# Patient Record
Sex: FEMALE | Race: WHITE | NOT HISPANIC OR LATINO
[De-identification: names, ages, dates, MRNs, and addresses within clinical notes are randomized per-mention and may not be internally consistent; named-entity substitution may affect disease eponyms.]

---

## 2017-02-23 ENCOUNTER — APPOINTMENT (OUTPATIENT)
Dept: ULTRASOUND IMAGING | Facility: IMAGING CENTER | Age: 67
End: 2017-02-23

## 2017-02-23 ENCOUNTER — OUTPATIENT (OUTPATIENT)
Dept: OUTPATIENT SERVICES | Facility: HOSPITAL | Age: 67
LOS: 1 days | End: 2017-02-23
Payer: MEDICARE

## 2017-02-23 ENCOUNTER — APPOINTMENT (OUTPATIENT)
Dept: MAMMOGRAPHY | Facility: IMAGING CENTER | Age: 67
End: 2017-02-23

## 2017-02-23 DIAGNOSIS — Z00.8 ENCOUNTER FOR OTHER GENERAL EXAMINATION: ICD-10-CM

## 2017-02-23 DIAGNOSIS — N60.29 FIBROADENOSIS OF UNSPECIFIED BREAST: Chronic | ICD-10-CM

## 2017-02-23 DIAGNOSIS — Z90.49 ACQUIRED ABSENCE OF OTHER SPECIFIED PARTS OF DIGESTIVE TRACT: Chronic | ICD-10-CM

## 2017-02-23 PROCEDURE — G0279: CPT

## 2017-02-23 PROCEDURE — 76641 ULTRASOUND BREAST COMPLETE: CPT

## 2017-02-23 PROCEDURE — 77066 DX MAMMO INCL CAD BI: CPT

## 2017-05-08 ENCOUNTER — OUTPATIENT (OUTPATIENT)
Dept: OUTPATIENT SERVICES | Facility: HOSPITAL | Age: 67
LOS: 1 days | Discharge: HOME | End: 2017-05-08

## 2017-05-08 DIAGNOSIS — N60.29 FIBROADENOSIS OF UNSPECIFIED BREAST: Chronic | ICD-10-CM

## 2017-05-08 DIAGNOSIS — Z90.49 ACQUIRED ABSENCE OF OTHER SPECIFIED PARTS OF DIGESTIVE TRACT: Chronic | ICD-10-CM

## 2017-05-08 DIAGNOSIS — C50.919 MALIGNANT NEOPLASM OF UNSPECIFIED SITE OF UNSPECIFIED FEMALE BREAST: ICD-10-CM

## 2017-05-10 ENCOUNTER — APPOINTMENT (OUTPATIENT)
Dept: HEMATOLOGY ONCOLOGY | Facility: CLINIC | Age: 67
End: 2017-05-10

## 2017-05-10 VITALS
WEIGHT: 148.15 LBS | OXYGEN SATURATION: 95 % | TEMPERATURE: 98 F | DIASTOLIC BLOOD PRESSURE: 80 MMHG | HEART RATE: 77 BPM | SYSTOLIC BLOOD PRESSURE: 140 MMHG | BODY MASS INDEX: 25.43 KG/M2 | RESPIRATION RATE: 16 BRPM

## 2017-08-17 ENCOUNTER — OUTPATIENT (OUTPATIENT)
Dept: OUTPATIENT SERVICES | Facility: HOSPITAL | Age: 67
LOS: 1 days | End: 2017-08-17
Payer: MEDICARE

## 2017-08-17 ENCOUNTER — APPOINTMENT (OUTPATIENT)
Dept: MRI IMAGING | Facility: IMAGING CENTER | Age: 67
End: 2017-08-17
Payer: COMMERCIAL

## 2017-08-17 DIAGNOSIS — N60.29 FIBROADENOSIS OF UNSPECIFIED BREAST: Chronic | ICD-10-CM

## 2017-08-17 DIAGNOSIS — Z00.8 ENCOUNTER FOR OTHER GENERAL EXAMINATION: ICD-10-CM

## 2017-08-17 DIAGNOSIS — Z90.49 ACQUIRED ABSENCE OF OTHER SPECIFIED PARTS OF DIGESTIVE TRACT: Chronic | ICD-10-CM

## 2017-08-17 PROCEDURE — A9585: CPT

## 2017-08-17 PROCEDURE — 77059 MRI BREAST BILATERAL: CPT | Mod: 26

## 2017-08-17 PROCEDURE — C8908: CPT

## 2017-08-17 PROCEDURE — C8937: CPT

## 2017-08-17 PROCEDURE — 0159T: CPT | Mod: 26

## 2017-08-17 PROCEDURE — 82565 ASSAY OF CREATININE: CPT

## 2017-11-16 ENCOUNTER — OUTPATIENT (OUTPATIENT)
Dept: OUTPATIENT SERVICES | Facility: HOSPITAL | Age: 67
LOS: 1 days | Discharge: HOME | End: 2017-11-16

## 2017-11-16 DIAGNOSIS — C50.919 MALIGNANT NEOPLASM OF UNSPECIFIED SITE OF UNSPECIFIED FEMALE BREAST: ICD-10-CM

## 2017-11-16 DIAGNOSIS — N60.29 FIBROADENOSIS OF UNSPECIFIED BREAST: Chronic | ICD-10-CM

## 2017-11-16 DIAGNOSIS — Z90.49 ACQUIRED ABSENCE OF OTHER SPECIFIED PARTS OF DIGESTIVE TRACT: Chronic | ICD-10-CM

## 2017-11-22 ENCOUNTER — APPOINTMENT (OUTPATIENT)
Dept: HEMATOLOGY ONCOLOGY | Facility: CLINIC | Age: 67
End: 2017-11-22
Payer: MEDICARE

## 2017-11-22 ENCOUNTER — RESULT REVIEW (OUTPATIENT)
Age: 67
End: 2017-11-22

## 2017-11-22 VITALS
HEART RATE: 83 BPM | OXYGEN SATURATION: 98 % | TEMPERATURE: 98.4 F | RESPIRATION RATE: 16 BRPM | BODY MASS INDEX: 25.35 KG/M2 | WEIGHT: 147.71 LBS

## 2017-11-22 LAB
HCT VFR BLD CALC: 43 % — SIGNIFICANT CHANGE UP (ref 34.5–45)
HGB BLD-MCNC: 14.6 G/DL — SIGNIFICANT CHANGE UP (ref 11.5–15.5)
MCHC RBC-ENTMCNC: 28.1 PG — SIGNIFICANT CHANGE UP (ref 27–34)
MCHC RBC-ENTMCNC: 34 G/DL — SIGNIFICANT CHANGE UP (ref 32–36)
MCV RBC AUTO: 82.6 FL — SIGNIFICANT CHANGE UP (ref 80–100)
PLATELET # BLD AUTO: 142 K/UL — LOW (ref 150–400)
RBC # BLD: 5.21 M/UL — HIGH (ref 3.8–5.2)
RBC # FLD: 11.8 % — SIGNIFICANT CHANGE UP (ref 10.3–14.5)
WBC # BLD: 8.3 K/UL — SIGNIFICANT CHANGE UP (ref 3.8–10.5)
WBC # FLD AUTO: 8.3 K/UL — SIGNIFICANT CHANGE UP (ref 3.8–10.5)

## 2017-11-22 PROCEDURE — 99215 OFFICE O/P EST HI 40 MIN: CPT

## 2017-11-28 LAB
25(OH)D3 SERPL-MCNC: 32.7 NG/ML
ALBUMIN SERPL ELPH-MCNC: 4.5 G/DL
ALP BLD-CCNC: 53 U/L
ALT SERPL-CCNC: 37 U/L
ANION GAP SERPL CALC-SCNC: 14 MMOL/L
AST SERPL-CCNC: 25 U/L
BILIRUB SERPL-MCNC: 0.8 MG/DL
BUN SERPL-MCNC: 24 MG/DL
CALCIUM SERPL-MCNC: 10.9 MG/DL
CHLORIDE SERPL-SCNC: 102 MMOL/L
CO2 SERPL-SCNC: 25 MMOL/L
CREAT SERPL-MCNC: 1.22 MG/DL
GLUCOSE SERPL-MCNC: 112 MG/DL
POTASSIUM SERPL-SCNC: 4.9 MMOL/L
PROT SERPL-MCNC: 7 G/DL
SODIUM SERPL-SCNC: 141 MMOL/L

## 2017-11-29 ENCOUNTER — APPOINTMENT (OUTPATIENT)
Dept: SURGERY | Facility: CLINIC | Age: 67
End: 2017-11-29
Payer: MEDICARE

## 2017-11-29 PROCEDURE — 99213K: CUSTOM

## 2017-12-05 ENCOUNTER — TRANSCRIPTION ENCOUNTER (OUTPATIENT)
Age: 67
End: 2017-12-05

## 2017-12-14 ENCOUNTER — LABORATORY RESULT (OUTPATIENT)
Age: 67
End: 2017-12-14

## 2017-12-14 ENCOUNTER — RESULT REVIEW (OUTPATIENT)
Age: 67
End: 2017-12-14

## 2017-12-14 ENCOUNTER — APPOINTMENT (OUTPATIENT)
Dept: HEMATOLOGY ONCOLOGY | Facility: CLINIC | Age: 67
End: 2017-12-14

## 2017-12-14 LAB
BASOPHILS # BLD AUTO: 0.1 K/UL — SIGNIFICANT CHANGE UP (ref 0–0.2)
BASOPHILS NFR BLD AUTO: 0.7 % — SIGNIFICANT CHANGE UP (ref 0–2)
EOSINOPHIL # BLD AUTO: 0.1 K/UL — SIGNIFICANT CHANGE UP (ref 0–0.5)
EOSINOPHIL NFR BLD AUTO: 0.7 % — SIGNIFICANT CHANGE UP (ref 0–6)
HCT VFR BLD CALC: 42.4 % — SIGNIFICANT CHANGE UP (ref 34.5–45)
HGB BLD-MCNC: 14.4 G/DL — SIGNIFICANT CHANGE UP (ref 11.5–15.5)
LYMPHOCYTES # BLD AUTO: 1.6 K/UL — SIGNIFICANT CHANGE UP (ref 1–3.3)
LYMPHOCYTES # BLD AUTO: 19.8 % — SIGNIFICANT CHANGE UP (ref 13–44)
MCHC RBC-ENTMCNC: 28.2 PG — SIGNIFICANT CHANGE UP (ref 27–34)
MCHC RBC-ENTMCNC: 34 G/DL — SIGNIFICANT CHANGE UP (ref 32–36)
MCV RBC AUTO: 83.1 FL — SIGNIFICANT CHANGE UP (ref 80–100)
MONOCYTES # BLD AUTO: 0.6 K/UL — SIGNIFICANT CHANGE UP (ref 0–0.9)
MONOCYTES NFR BLD AUTO: 7.6 % — SIGNIFICANT CHANGE UP (ref 2–14)
NEUTROPHILS # BLD AUTO: 5.8 K/UL — SIGNIFICANT CHANGE UP (ref 1.8–7.4)
NEUTROPHILS NFR BLD AUTO: 71.1 % — SIGNIFICANT CHANGE UP (ref 43–77)
PLATELET # BLD AUTO: 150 K/UL — SIGNIFICANT CHANGE UP (ref 150–400)
RBC # BLD: 5.1 M/UL — SIGNIFICANT CHANGE UP (ref 3.8–5.2)
RBC # FLD: 11.4 % — SIGNIFICANT CHANGE UP (ref 10.3–14.5)
WBC # BLD: 8.2 K/UL — SIGNIFICANT CHANGE UP (ref 3.8–10.5)
WBC # FLD AUTO: 8.2 K/UL — SIGNIFICANT CHANGE UP (ref 3.8–10.5)

## 2017-12-18 ENCOUNTER — CHART COPY (OUTPATIENT)
Age: 67
End: 2017-12-18

## 2017-12-19 ENCOUNTER — RESULT REVIEW (OUTPATIENT)
Age: 67
End: 2017-12-19

## 2017-12-19 LAB
ALBUMIN SERPL ELPH-MCNC: 4.3 G/DL
ALP BLD-CCNC: 56 U/L
ALT SERPL-CCNC: 28 U/L
ANION GAP SERPL CALC-SCNC: 15 MMOL/L
AST SERPL-CCNC: 18 U/L
BILIRUB SERPL-MCNC: 1 MG/DL
BUN SERPL-MCNC: 21 MG/DL
CALCIUM SERPL-MCNC: 10.9 MG/DL
CHLORIDE SERPL-SCNC: 104 MMOL/L
CO2 SERPL-SCNC: 24 MMOL/L
CREAT SERPL-MCNC: 1.06 MG/DL
GLUCOSE SERPL-MCNC: 103 MG/DL
POTASSIUM SERPL-SCNC: 4.9 MMOL/L
PROT SERPL-MCNC: 6.9 G/DL
SODIUM SERPL-SCNC: 143 MMOL/L

## 2018-02-28 ENCOUNTER — OUTPATIENT (OUTPATIENT)
Dept: OUTPATIENT SERVICES | Facility: HOSPITAL | Age: 68
LOS: 1 days | End: 2018-02-28
Payer: MEDICARE

## 2018-02-28 ENCOUNTER — APPOINTMENT (OUTPATIENT)
Dept: MAMMOGRAPHY | Facility: IMAGING CENTER | Age: 68
End: 2018-02-28
Payer: MEDICARE

## 2018-02-28 ENCOUNTER — APPOINTMENT (OUTPATIENT)
Dept: ULTRASOUND IMAGING | Facility: IMAGING CENTER | Age: 68
End: 2018-02-28
Payer: MEDICARE

## 2018-02-28 DIAGNOSIS — Z00.8 ENCOUNTER FOR OTHER GENERAL EXAMINATION: ICD-10-CM

## 2018-02-28 DIAGNOSIS — N60.29 FIBROADENOSIS OF UNSPECIFIED BREAST: Chronic | ICD-10-CM

## 2018-02-28 DIAGNOSIS — Z90.49 ACQUIRED ABSENCE OF OTHER SPECIFIED PARTS OF DIGESTIVE TRACT: Chronic | ICD-10-CM

## 2018-02-28 PROCEDURE — 77066 DX MAMMO INCL CAD BI: CPT | Mod: 26

## 2018-02-28 PROCEDURE — 76641 ULTRASOUND BREAST COMPLETE: CPT | Mod: 26,50

## 2018-02-28 PROCEDURE — G0279: CPT | Mod: 26

## 2018-02-28 PROCEDURE — 77066 DX MAMMO INCL CAD BI: CPT

## 2018-02-28 PROCEDURE — 76641 ULTRASOUND BREAST COMPLETE: CPT

## 2018-02-28 PROCEDURE — G0279: CPT

## 2018-05-18 ENCOUNTER — OUTPATIENT (OUTPATIENT)
Dept: OUTPATIENT SERVICES | Facility: HOSPITAL | Age: 68
LOS: 1 days | Discharge: HOME | End: 2018-05-18

## 2018-05-18 DIAGNOSIS — N60.29 FIBROADENOSIS OF UNSPECIFIED BREAST: Chronic | ICD-10-CM

## 2018-05-18 DIAGNOSIS — C50.919 MALIGNANT NEOPLASM OF UNSPECIFIED SITE OF UNSPECIFIED FEMALE BREAST: ICD-10-CM

## 2018-05-18 DIAGNOSIS — Z90.49 ACQUIRED ABSENCE OF OTHER SPECIFIED PARTS OF DIGESTIVE TRACT: Chronic | ICD-10-CM

## 2018-05-23 ENCOUNTER — APPOINTMENT (OUTPATIENT)
Dept: HEMATOLOGY ONCOLOGY | Facility: CLINIC | Age: 68
End: 2018-05-23
Payer: MEDICARE

## 2018-05-23 VITALS
RESPIRATION RATE: 16 BRPM | OXYGEN SATURATION: 100 % | TEMPERATURE: 98.7 F | HEART RATE: 81 BPM | WEIGHT: 141.1 LBS | SYSTOLIC BLOOD PRESSURE: 153 MMHG | DIASTOLIC BLOOD PRESSURE: 75 MMHG | BODY MASS INDEX: 24.22 KG/M2

## 2018-05-23 PROCEDURE — 99215 OFFICE O/P EST HI 40 MIN: CPT

## 2018-08-14 ENCOUNTER — OUTPATIENT (OUTPATIENT)
Dept: OUTPATIENT SERVICES | Facility: HOSPITAL | Age: 68
LOS: 1 days | End: 2018-08-14
Payer: MEDICARE

## 2018-08-14 ENCOUNTER — APPOINTMENT (OUTPATIENT)
Dept: MRI IMAGING | Facility: IMAGING CENTER | Age: 68
End: 2018-08-14
Payer: MEDICARE

## 2018-08-14 DIAGNOSIS — N60.29 FIBROADENOSIS OF UNSPECIFIED BREAST: Chronic | ICD-10-CM

## 2018-08-14 DIAGNOSIS — Z90.49 ACQUIRED ABSENCE OF OTHER SPECIFIED PARTS OF DIGESTIVE TRACT: Chronic | ICD-10-CM

## 2018-08-14 DIAGNOSIS — Z00.8 ENCOUNTER FOR OTHER GENERAL EXAMINATION: ICD-10-CM

## 2018-08-14 PROCEDURE — 77059 MRI BREAST BILATERAL: CPT | Mod: 26

## 2018-08-14 PROCEDURE — 82565 ASSAY OF CREATININE: CPT

## 2018-08-14 PROCEDURE — 0159T: CPT | Mod: 26

## 2018-08-14 PROCEDURE — C8937: CPT

## 2018-08-14 PROCEDURE — A9585: CPT

## 2018-08-14 PROCEDURE — C8908: CPT

## 2018-11-13 ENCOUNTER — OUTPATIENT (OUTPATIENT)
Dept: OUTPATIENT SERVICES | Facility: HOSPITAL | Age: 68
LOS: 1 days | Discharge: HOME | End: 2018-11-13

## 2018-11-13 DIAGNOSIS — Z90.49 ACQUIRED ABSENCE OF OTHER SPECIFIED PARTS OF DIGESTIVE TRACT: Chronic | ICD-10-CM

## 2018-11-13 DIAGNOSIS — C50.919 MALIGNANT NEOPLASM OF UNSPECIFIED SITE OF UNSPECIFIED FEMALE BREAST: ICD-10-CM

## 2018-11-13 DIAGNOSIS — N60.29 FIBROADENOSIS OF UNSPECIFIED BREAST: Chronic | ICD-10-CM

## 2018-11-14 ENCOUNTER — FORM ENCOUNTER (OUTPATIENT)
Age: 68
End: 2018-11-14

## 2018-11-15 ENCOUNTER — APPOINTMENT (OUTPATIENT)
Dept: RADIOLOGY | Facility: IMAGING CENTER | Age: 68
End: 2018-11-15
Payer: MEDICARE

## 2018-11-15 ENCOUNTER — OUTPATIENT (OUTPATIENT)
Dept: OUTPATIENT SERVICES | Facility: HOSPITAL | Age: 68
LOS: 1 days | End: 2018-11-15
Payer: MEDICARE

## 2018-11-15 DIAGNOSIS — N60.29 FIBROADENOSIS OF UNSPECIFIED BREAST: Chronic | ICD-10-CM

## 2018-11-15 DIAGNOSIS — Z90.49 ACQUIRED ABSENCE OF OTHER SPECIFIED PARTS OF DIGESTIVE TRACT: Chronic | ICD-10-CM

## 2018-11-15 DIAGNOSIS — C50.919 MALIGNANT NEOPLASM OF UNSPECIFIED SITE OF UNSPECIFIED FEMALE BREAST: ICD-10-CM

## 2018-11-15 PROCEDURE — 77080 DXA BONE DENSITY AXIAL: CPT

## 2018-11-15 PROCEDURE — 77080 DXA BONE DENSITY AXIAL: CPT | Mod: 26

## 2018-11-21 ENCOUNTER — APPOINTMENT (OUTPATIENT)
Dept: HEMATOLOGY ONCOLOGY | Facility: CLINIC | Age: 68
End: 2018-11-21
Payer: MEDICARE

## 2018-11-21 VITALS
TEMPERATURE: 99.4 F | WEIGHT: 142.86 LBS | OXYGEN SATURATION: 100 % | HEART RATE: 77 BPM | DIASTOLIC BLOOD PRESSURE: 83 MMHG | SYSTOLIC BLOOD PRESSURE: 143 MMHG | BODY MASS INDEX: 24.52 KG/M2 | RESPIRATION RATE: 16 BRPM

## 2018-11-21 DIAGNOSIS — R19.7 DIARRHEA, UNSPECIFIED: ICD-10-CM

## 2018-11-21 PROCEDURE — 99214 OFFICE O/P EST MOD 30 MIN: CPT

## 2018-11-21 NOTE — PHYSICAL EXAM
[Fully active, able to carry on all pre-disease performance without restriction] : Status 0 - Fully active, able to carry on all pre-disease performance without restriction [Normal] : affect appropriate [de-identified] : Left breast periareolar lumpectomy scar with post RT changes.

## 2018-11-21 NOTE — ASSESSMENT
[FreeTextEntry1] : In summary, this is a 68-year-old postmenopausal  lady with stage IA invasive ductal carcinoma of the left breast. T1a, N0, M0. ER positive, MS positive, HER-2/nimco negative. She is status post lumpectomy and sentinel axillary lymph node excision.  Patient has a good performance status and is generally very healthy. She completed RT and started Letrozole July 2016.\par \par - Breast ca: LIZETTE. Tolerating Letrozole very well without significant side effects. Reports good compliance. Continue AI for total of 5-10 years. Breast imaging per Dr Reddy.\par - Osteopenia- DEXA results reviewed with the pt (11/2018). Continue calcium and vitamin D. \par - High chol:  Continue simvastatin. Healthy diet and exercise \par - Diarrhea- Work up ordered but she wants to see PCP. Doesnt have typical sx of HUS/infection or cdiff\par - Continue to followup with primary care and Gyn\par \par RTC 6 m.

## 2018-11-21 NOTE — HISTORY OF PRESENT ILLNESS
[Date: ____________] : Patient's last distress assessment performed on [unfilled]. [0 - No Distress] : Distress Level: 0 [Patient given social work contact information and resource sheet] : Patient was given social work contact information and resource sheet [de-identified] : Carolyn Busby is a 68-year-old lady who underwent diagnostic mammogram and sonogram on 2/10/16 which showed new left breast distortion and a stereotactic biopsy was recommended. The biopsy was performed at [facility] on 2/22/16 which showed invasive well-differentiated ductal carcinoma with tubular features, Arturo score 4 of 9, 0.3 cm, DCIS low-grade, %, %, HER-2 negative. She underwent breast MRI on 3/3/16 which showed additional 0.7 cm indeterminate small focal area of enhancement in central posterior left breast  and a 0.8 cm enhancing nodule superior central right breast at 12:00.\par She underwent MRI guided biopsy of both lesions. The pathology report from left side showed fibrocystic changes. 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 She underwent bilateral lumpectomies and left sentinel axillary lymph node excision on 3/15/16 which showed invasive ductal carcinoma, measuring 3 mm, grade 1, intermediate grade DCIS in the left breast. 3 out of 3 sentinel lymph nodes were negative. Margins were negative and no lymphovascular invasion was noted. %, %, HER-2/nimco negative. Right breast excision showed flat epithelial atypia and benign changes.\par \par The last bone density scan from 7/10/2014 showed osteopenia with a T score of -1.6 at left femoral neck. \par She refused genetic testing as her niece has been tested negative for deleterious BRCA mutation.\par RT completed June 2016\par  [FreeTextEntry1] : Letrozole started July 2016 [de-identified] : Ms. RENE BARROSO is here for f/u left breast cancer on endocrine therapy since 7/2016\par On letrozole. Good compliance\par No new aches/pain, hot flashes, vag dryness\par Has chronic back pain 2/2 bulging discs, which is unchanged, doesn’t need pain meds\par active, no change in energy, wt or appetite, gym 3-4 days a week\par cholesterol f/b PMD- well controlled. \par Breast imaging with Dr Reddy\par DEXA 11/2018- osteopenia (-1.8), cont vit D, calcium. \par She had Lettuce on Friday and has been having cramps and diarrhea 2-3 times a day. She took Imodium yesterday and it stopped all day.  No blood in her stool. Her PCP is closed. She is very concerned about Ecoli outbreak from Lettuce. I ordered stool studies but then she refused and decided to go see PCP.

## 2018-11-21 NOTE — REASON FOR VISIT
[Follow-Up Visit] : a follow-up [Other: _____] : [unfilled] [FreeTextEntry2] : Breast cancer ER/[place] + HER-2 -

## 2018-11-21 NOTE — OB HISTORY
[Definite:  ___ (Date)] : the last menstrual period was [unfilled] [Menarche Age: ____] : age at menarche was [unfilled] [Currently In Menopause] : currently in menopause [___] : Living: [unfilled] [Experiencing Menopausal Sxs] : not experiencing menopausal symptoms

## 2018-11-28 ENCOUNTER — APPOINTMENT (OUTPATIENT)
Dept: SURGERY | Facility: CLINIC | Age: 68
End: 2018-11-28
Payer: MEDICARE

## 2018-11-28 PROCEDURE — 99213K: CUSTOM

## 2019-03-07 ENCOUNTER — OUTPATIENT (OUTPATIENT)
Dept: OUTPATIENT SERVICES | Facility: HOSPITAL | Age: 69
LOS: 1 days | End: 2019-03-07
Payer: MEDICARE

## 2019-03-07 ENCOUNTER — APPOINTMENT (OUTPATIENT)
Dept: ULTRASOUND IMAGING | Facility: IMAGING CENTER | Age: 69
End: 2019-03-07
Payer: MEDICARE

## 2019-03-07 ENCOUNTER — APPOINTMENT (OUTPATIENT)
Dept: MAMMOGRAPHY | Facility: IMAGING CENTER | Age: 69
End: 2019-03-07
Payer: MEDICARE

## 2019-03-07 DIAGNOSIS — Z00.8 ENCOUNTER FOR OTHER GENERAL EXAMINATION: ICD-10-CM

## 2019-03-07 DIAGNOSIS — Z90.49 ACQUIRED ABSENCE OF OTHER SPECIFIED PARTS OF DIGESTIVE TRACT: Chronic | ICD-10-CM

## 2019-03-07 DIAGNOSIS — N60.29 FIBROADENOSIS OF UNSPECIFIED BREAST: Chronic | ICD-10-CM

## 2019-03-07 PROCEDURE — 76641 ULTRASOUND BREAST COMPLETE: CPT | Mod: 26,50

## 2019-03-07 PROCEDURE — G0279: CPT | Mod: 26

## 2019-03-07 PROCEDURE — 76641 ULTRASOUND BREAST COMPLETE: CPT

## 2019-03-07 PROCEDURE — 77066 DX MAMMO INCL CAD BI: CPT | Mod: 26

## 2019-03-07 PROCEDURE — 77066 DX MAMMO INCL CAD BI: CPT

## 2019-03-07 PROCEDURE — G0279: CPT

## 2019-05-21 ENCOUNTER — OUTPATIENT (OUTPATIENT)
Dept: OUTPATIENT SERVICES | Facility: HOSPITAL | Age: 69
LOS: 1 days | Discharge: HOME | End: 2019-05-21

## 2019-05-21 DIAGNOSIS — Z90.49 ACQUIRED ABSENCE OF OTHER SPECIFIED PARTS OF DIGESTIVE TRACT: Chronic | ICD-10-CM

## 2019-05-21 DIAGNOSIS — C50.919 MALIGNANT NEOPLASM OF UNSPECIFIED SITE OF UNSPECIFIED FEMALE BREAST: ICD-10-CM

## 2019-05-21 DIAGNOSIS — N60.29 FIBROADENOSIS OF UNSPECIFIED BREAST: Chronic | ICD-10-CM

## 2019-05-22 ENCOUNTER — APPOINTMENT (OUTPATIENT)
Dept: HEMATOLOGY ONCOLOGY | Facility: CLINIC | Age: 69
End: 2019-05-22
Payer: MEDICARE

## 2019-05-22 VITALS
SYSTOLIC BLOOD PRESSURE: 142 MMHG | HEART RATE: 75 BPM | RESPIRATION RATE: 16 BRPM | TEMPERATURE: 98.7 F | DIASTOLIC BLOOD PRESSURE: 84 MMHG | WEIGHT: 145.48 LBS | BODY MASS INDEX: 24.97 KG/M2 | OXYGEN SATURATION: 97 %

## 2019-05-22 PROCEDURE — 99214 OFFICE O/P EST MOD 30 MIN: CPT

## 2019-05-22 NOTE — PHYSICAL EXAM
[Fully active, able to carry on all pre-disease performance without restriction] : Status 0 - Fully active, able to carry on all pre-disease performance without restriction [Normal] : affect appropriate [de-identified] : Left breast periareolar lumpectomy scar with post RT changes

## 2019-05-22 NOTE — REASON FOR VISIT
[Follow-Up Visit] : a follow-up [Other: _____] : [unfilled] [FreeTextEntry2] : Breast cancer ER/[place] + HER-2 -

## 2019-05-22 NOTE — HISTORY OF PRESENT ILLNESS
[Date: ____________] : Patient's last distress assessment performed on [unfilled]. [0 - No Distress] : Distress Level: 0 [Patient given social work contact information and resource sheet] : Patient was given social work contact information and resource sheet [de-identified] : Carolyn Busby is a 69-year-old lady who underwent diagnostic mammogram and sonogram on 2/10/16 which showed new left breast distortion and a stereotactic biopsy was recommended. The biopsy was performed at [facility] on 2/22/16 which showed invasive well-differentiated ductal carcinoma with tubular features, Arturo score 4 of 9, 0.3 cm, DCIS low-grade, %, %, HER-2 negative. She underwent breast MRI on 3/3/16 which showed additional 0.7 cm indeterminate small focal area of enhancement in central posterior left breast  and a 0.8 cm enhancing nodule superior central right breast at 12:00.\par She underwent MRI guided biopsy of both lesions. The pathology report from left side showed fibrocystic changes. 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 She underwent bilateral lumpectomies and left sentinel axillary lymph node excision on 3/15/16 which showed invasive ductal carcinoma, measuring 3 mm, grade 1, intermediate grade DCIS in the left breast. 3 out of 3 sentinel lymph nodes were negative. Margins were negative and no lymphovascular invasion was noted. %, %, HER-2/nimco negative. Right breast excision showed flat epithelial atypia and benign changes.\par \par The last bone density scan from 7/10/2014 showed osteopenia with a T score of -1.6 at left femoral neck. \par She refused genetic testing as her niece has been tested negative for deleterious BRCA mutation.\par RT completed June 2016\par  [FreeTextEntry1] : Letrozole started July 2016 [de-identified] : Ms. RENE BARROSO is here for f/u left breast cancer on endocrine therapy since 7/2016\par On letrozole. Good compliance. \par Has chronic back pain 2/2 bulging discs, which is unchanged, doesn’t need pain meds. No new aches/pain, hot flashes, vag dryness, GI s/e, hair loss. She is active, no change in energy, wt or appetite, gym 3-4 days a week. Her cholesterol f/b PMD- well controlled. \par Breast imaging with Dr Reddy: 3/2019: BIRADS 2\par DEXA 11/2018- osteopenia (-1.8), cont vit D, high calcium diet\par

## 2019-08-16 ENCOUNTER — OUTPATIENT (OUTPATIENT)
Dept: OUTPATIENT SERVICES | Facility: HOSPITAL | Age: 69
LOS: 1 days | End: 2019-08-16
Payer: MEDICARE

## 2019-08-16 ENCOUNTER — APPOINTMENT (OUTPATIENT)
Dept: MRI IMAGING | Facility: IMAGING CENTER | Age: 69
End: 2019-08-16
Payer: MEDICARE

## 2019-08-16 DIAGNOSIS — N60.29 FIBROADENOSIS OF UNSPECIFIED BREAST: Chronic | ICD-10-CM

## 2019-08-16 DIAGNOSIS — Z90.49 ACQUIRED ABSENCE OF OTHER SPECIFIED PARTS OF DIGESTIVE TRACT: Chronic | ICD-10-CM

## 2019-08-16 DIAGNOSIS — Z00.8 ENCOUNTER FOR OTHER GENERAL EXAMINATION: ICD-10-CM

## 2019-08-16 PROCEDURE — C8908: CPT

## 2019-08-16 PROCEDURE — C8937: CPT

## 2019-08-16 PROCEDURE — 77049 MRI BREAST C-+ W/CAD BI: CPT | Mod: 26

## 2019-08-16 PROCEDURE — A9585: CPT

## 2019-10-31 ENCOUNTER — OUTPATIENT (OUTPATIENT)
Dept: OUTPATIENT SERVICES | Facility: HOSPITAL | Age: 69
LOS: 1 days | Discharge: HOME | End: 2019-10-31

## 2019-10-31 DIAGNOSIS — C50.919 MALIGNANT NEOPLASM OF UNSPECIFIED SITE OF UNSPECIFIED FEMALE BREAST: ICD-10-CM

## 2019-10-31 DIAGNOSIS — N60.29 FIBROADENOSIS OF UNSPECIFIED BREAST: Chronic | ICD-10-CM

## 2019-10-31 DIAGNOSIS — Z90.49 ACQUIRED ABSENCE OF OTHER SPECIFIED PARTS OF DIGESTIVE TRACT: Chronic | ICD-10-CM

## 2019-11-13 ENCOUNTER — APPOINTMENT (OUTPATIENT)
Dept: HEMATOLOGY ONCOLOGY | Facility: CLINIC | Age: 69
End: 2019-11-13
Payer: MEDICARE

## 2019-11-13 VITALS
HEART RATE: 81 BPM | RESPIRATION RATE: 16 BRPM | OXYGEN SATURATION: 98 % | BODY MASS INDEX: 25.06 KG/M2 | SYSTOLIC BLOOD PRESSURE: 150 MMHG | DIASTOLIC BLOOD PRESSURE: 77 MMHG | WEIGHT: 145.99 LBS | TEMPERATURE: 99.2 F

## 2019-11-13 PROCEDURE — 99214 OFFICE O/P EST MOD 30 MIN: CPT

## 2019-11-13 NOTE — PHYSICAL EXAM
[Fully active, able to carry on all pre-disease performance without restriction] : Status 0 - Fully active, able to carry on all pre-disease performance without restriction [Normal] : affect appropriate [de-identified] : Left breast periareolar lumpectomy scar with post RT changes.

## 2019-11-13 NOTE — OB HISTORY
[Definite:  ___ (Date)] : the last menstrual period was [unfilled] [Currently In Menopause] : currently in menopause [Menarche Age: ____] : age at menarche was [unfilled] [___] : Living: [unfilled] [Experiencing Menopausal Sxs] : not experiencing menopausal symptoms

## 2019-11-13 NOTE — REASON FOR VISIT
[Follow-Up Visit] : a follow-up [Other: _____] : [unfilled] [FreeTextEntry2] : Breast cancer ER/ME + HER-2 -

## 2019-11-13 NOTE — HISTORY OF PRESENT ILLNESS
[Date: ____________] : Patient's last distress assessment performed on [unfilled]. [0 - No Distress] : Distress Level: 0 [Patient given social work contact information and resource sheet] : Patient was given social work contact information and resource sheet [de-identified] : Carolyn Busby is a 69-year-old lady who underwent diagnostic mammogram and sonogram on 2/10/16 which showed new left breast distortion and a stereotactic biopsy was recommended. The biopsy was performed at [facility] on 2/22/16 which showed invasive well-differentiated ductal carcinoma with tubular features, Arturo score 4 of 9, 0.3 cm, DCIS low-grade, %, %, HER-2 negative. She underwent breast MRI on 3/3/16 which showed additional 0.7 cm indeterminate small focal area of enhancement in central posterior left breast  and a 0.8 cm enhancing nodule superior central right breast at 12:00.\par She underwent MRI guided biopsy of both lesions. The pathology report from left side showed fibrocystic changes. 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 She underwent bilateral lumpectomies and left sentinel axillary lymph node excision on 3/15/16 which showed invasive ductal carcinoma, measuring 3 mm, grade 1, intermediate grade DCIS in the left breast. 3 out of 3 sentinel lymph nodes were negative. Margins were negative and no lymphovascular invasion was noted. %, %, HER-2/nimco negative. Right breast excision showed flat epithelial atypia and benign changes.\par \par The last bone density scan from 7/10/2014 showed osteopenia with a T score of -1.6 at left femoral neck. \par She refused genetic testing as her niece has been tested negative for deleterious BRCA mutation.\par RT completed June 2016\par  [de-identified] : Ms. RENE BARROSO is here for f/u left breast cancer on endocrine therapy since 7/2016\par On letrozole. Good compliance. Has chronic back pain 2/2 bulging discs, which is unchanged, doesn’t need pain meds. No new aches/pain, hot flashes, vag dryness, GI s/e, hair loss. She is using CBD oil. She is active, no change in energy, wt or appetite, gym 3-4 days a week. Her cholesterol f/b PMD- well controlled. \par Breast imaging with Dr Reddy: 3/2019: BIRADS 2\par DEXA 11/2018- osteopenia (-1.8), cont vit D, high calcium diet\par  [FreeTextEntry1] : Letrozole started July 2016

## 2019-11-13 NOTE — ASSESSMENT
[FreeTextEntry1] : In summary, this is a 69-year-old postmenopausal  lady with stage IA invasive ductal carcinoma of the left breast. T1a, N0, M0. ER positive, RI positive, HER-2/nimco negative. She is status post lumpectomy and sentinel axillary lymph node excision.  Patient has a good performance status and is generally very healthy. She completed RT and started Letrozole July 2016.\par \par - Breast ca: LIZETTE. Tolerating Letrozole very well without significant side effects. Reports good compliance. Continue AI for total of 5-10 years. Breast imaging per Dr Reddy.\par - Osteopenia: concern for worsening bone density due to anastrozole. Rec to  continue calcium and vit D. DEXA 1-2 yrs. \par - High cholesterol: concern for worsening cholesterol due to anastrozole. Pt is on zocor. Lipid profile annually.\par - Continue to followup with primary care and Gyn\par - BW good\par RTC 6 m.

## 2019-11-20 ENCOUNTER — APPOINTMENT (OUTPATIENT)
Dept: SURGERY | Facility: CLINIC | Age: 69
End: 2019-11-20
Payer: MEDICARE

## 2019-11-20 PROCEDURE — 99213K: CUSTOM

## 2020-03-20 ENCOUNTER — RESULT REVIEW (OUTPATIENT)
Age: 70
End: 2020-03-20

## 2020-03-20 ENCOUNTER — APPOINTMENT (OUTPATIENT)
Dept: ULTRASOUND IMAGING | Facility: IMAGING CENTER | Age: 70
End: 2020-03-20
Payer: MEDICARE

## 2020-03-20 ENCOUNTER — OUTPATIENT (OUTPATIENT)
Dept: OUTPATIENT SERVICES | Facility: HOSPITAL | Age: 70
LOS: 1 days | End: 2020-03-20
Payer: MEDICARE

## 2020-03-20 ENCOUNTER — APPOINTMENT (OUTPATIENT)
Dept: MAMMOGRAPHY | Facility: IMAGING CENTER | Age: 70
End: 2020-03-20
Payer: MEDICARE

## 2020-03-20 DIAGNOSIS — Z90.49 ACQUIRED ABSENCE OF OTHER SPECIFIED PARTS OF DIGESTIVE TRACT: Chronic | ICD-10-CM

## 2020-03-20 DIAGNOSIS — N60.29 FIBROADENOSIS OF UNSPECIFIED BREAST: Chronic | ICD-10-CM

## 2020-03-20 DIAGNOSIS — R92.8 OTHER ABNORMAL AND INCONCLUSIVE FINDINGS ON DIAGNOSTIC IMAGING OF BREAST: ICD-10-CM

## 2020-03-20 PROCEDURE — G0279: CPT | Mod: 26

## 2020-03-20 PROCEDURE — 77066 DX MAMMO INCL CAD BI: CPT | Mod: 26

## 2020-03-20 PROCEDURE — 77066 DX MAMMO INCL CAD BI: CPT

## 2020-03-20 PROCEDURE — 76641 ULTRASOUND BREAST COMPLETE: CPT | Mod: 26,50

## 2020-03-20 PROCEDURE — 76641 ULTRASOUND BREAST COMPLETE: CPT

## 2020-03-20 PROCEDURE — G0279: CPT

## 2020-05-15 ENCOUNTER — OUTPATIENT (OUTPATIENT)
Dept: OUTPATIENT SERVICES | Facility: HOSPITAL | Age: 70
LOS: 1 days | Discharge: HOME | End: 2020-05-15

## 2020-05-15 DIAGNOSIS — N60.29 FIBROADENOSIS OF UNSPECIFIED BREAST: Chronic | ICD-10-CM

## 2020-05-15 DIAGNOSIS — C50.919 MALIGNANT NEOPLASM OF UNSPECIFIED SITE OF UNSPECIFIED FEMALE BREAST: ICD-10-CM

## 2020-05-15 DIAGNOSIS — Z90.49 ACQUIRED ABSENCE OF OTHER SPECIFIED PARTS OF DIGESTIVE TRACT: Chronic | ICD-10-CM

## 2020-05-20 ENCOUNTER — APPOINTMENT (OUTPATIENT)
Dept: HEMATOLOGY ONCOLOGY | Facility: CLINIC | Age: 70
End: 2020-05-20
Payer: MEDICARE

## 2020-05-20 ENCOUNTER — APPOINTMENT (OUTPATIENT)
Dept: HEMATOLOGY ONCOLOGY | Facility: CLINIC | Age: 70
End: 2020-05-20

## 2020-05-20 PROCEDURE — 99214 OFFICE O/P EST MOD 30 MIN: CPT | Mod: 95

## 2020-05-20 NOTE — ASSESSMENT
[FreeTextEntry1] : In summary, this is a 69-year-old postmenopausal  lady with stage IA invasive ductal carcinoma of the left breast. T1a, N0, M0. ER positive, VA positive, HER-2/nimco negative. She is status post lumpectomy and sentinel axillary lymph node excision.  Patient has a good performance status and is generally very healthy. She completed RT and started Letrozole July 2016.\par \par - Breast ca: LIZETTE. Tolerating Letrozole very well without significant side effects. Reports good compliance. Continue AI for total of 5-10 years. Breast imaging  3/2020 reviewed\par - Osteopenia: concern for worsening bone density due to anastrozole. Rec to  continue calcium and vit D. DEXA 1-2 yrs. \par - High cholesterol: concern for worsening cholesterol due to anastrozole. Pt is on zocor. Lipid profile annually.\par - Continue to followup with primary care and Gyn\par - BW 3/2020 reviewed\par RTC 6 m.

## 2020-05-20 NOTE — REASON FOR VISIT
[Follow-Up Visit] : a follow-up [Other: _____] : [unfilled] [FreeTextEntry2] : Breast cancer ER/[place] + HER-2 -

## 2020-05-20 NOTE — HISTORY OF PRESENT ILLNESS
[Home] : at home, [unfilled] , at the time of the visit. [Medical Office: ([facility])___] : at the medical office located in  [Date: ____________] : Patient's last distress assessment performed on [unfilled]. [0 - No Distress] : Distress Level: 0 [Patient given social work contact information and resource sheet] : Patient was given social work contact information and resource sheet [Verbal consent obtained from patient] : the patient, [unfilled] [de-identified] : Carolyn Busby is a 69-year-old lady who underwent diagnostic mammogram and sonogram on 2/10/16 which showed new left breast distortion and a stereotactic biopsy was recommended. The biopsy was performed at [facility] on 2/22/16 which showed invasive well-differentiated ductal carcinoma with tubular features, Arturo score 4 of 9, 0.3 cm, DCIS low-grade, %, %, HER-2 negative. She underwent breast MRI on 3/3/16 which showed additional 0.7 cm indeterminate small focal area of enhancement in central posterior left breast  and a 0.8 cm enhancing nodule superior central right breast at 12:00.\par She underwent MRI guided biopsy of both lesions. The pathology report from left side showed fibrocystic changes. 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 She underwent bilateral lumpectomies and left sentinel axillary lymph node excision on 3/15/16 which showed invasive ductal carcinoma, measuring 3 mm, grade 1, intermediate grade DCIS in the left breast. 3 out of 3 sentinel lymph nodes were negative. Margins were negative and no lymphovascular invasion was noted. %, %, HER-2/nimco negative. Right breast excision showed flat epithelial atypia and benign changes.\par \par The last bone density scan from 7/10/2014 showed osteopenia with a T score of -1.6 at left femoral neck. \par She refused genetic testing as her niece has been tested negative for deleterious BRCA mutation.\par RT completed June 2016\par  [FreeTextEntry1] : Letrozole started July 2016 [de-identified] : Ms. RENE BARROSO is here for f/u left breast cancer on endocrine therapy since 7/2016\par On letrozole. Good compliance. Has chronic back pain 2/2 bulging discs, which is unchanged, doesn’t need pain meds. No new aches/pain, hot flashes, vag dryness, GI s/e, hair loss. She is using CBD oil. She is active, no change in energy, wt or appetite. Her cholesterol f/b PMD- well controlled. \par Breast imaging with Dr Reddy: 3/2020: BIRADS 2\par DEXA 11/2018- osteopenia (-1.8), cont vit D, high calcium diet\par She got COVID and is recovering. Dint need hospitalization.

## 2020-05-20 NOTE — PHYSICAL EXAM
[Normal] : affect appropriate [Restricted in physically strenuous activity but ambulatory and able to carry out work of a light or sedentary nature] : Status 1- Restricted in physically strenuous activity but ambulatory and able to carry out work of a light or sedentary nature, e.g., light house work, office work [de-identified] : Constitutional: well developed, well nourished, in no acute distress. \par Eyes: no icterus seen. no conjunctival injection\par ENT: no tongue swelling, no nasal discharge\par Neck: no visible masses or goitre \par Pulmonary: no audible wheeze,  respirations unlabored\par Musculoskeletal: full range of motion, walking in the house\par Skin: no rash visible on face or upper chest

## 2020-05-21 ENCOUNTER — TRANSCRIPTION ENCOUNTER (OUTPATIENT)
Age: 70
End: 2020-05-21

## 2020-06-15 DIAGNOSIS — Z01.818 ENCOUNTER FOR OTHER PREPROCEDURAL EXAMINATION: ICD-10-CM

## 2020-06-16 ENCOUNTER — APPOINTMENT (OUTPATIENT)
Dept: DISASTER EMERGENCY | Facility: CLINIC | Age: 70
End: 2020-06-16

## 2020-06-17 LAB — SARS-COV-2 N GENE NPH QL NAA+PROBE: NOT DETECTED

## 2020-09-25 ENCOUNTER — OUTPATIENT (OUTPATIENT)
Dept: OUTPATIENT SERVICES | Facility: HOSPITAL | Age: 70
LOS: 1 days | End: 2020-09-25
Payer: MEDICARE

## 2020-09-25 ENCOUNTER — RESULT REVIEW (OUTPATIENT)
Age: 70
End: 2020-09-25

## 2020-09-25 ENCOUNTER — APPOINTMENT (OUTPATIENT)
Dept: MRI IMAGING | Facility: IMAGING CENTER | Age: 70
End: 2020-09-25
Payer: MEDICARE

## 2020-09-25 DIAGNOSIS — Z90.49 ACQUIRED ABSENCE OF OTHER SPECIFIED PARTS OF DIGESTIVE TRACT: Chronic | ICD-10-CM

## 2020-09-25 DIAGNOSIS — N60.29 FIBROADENOSIS OF UNSPECIFIED BREAST: Chronic | ICD-10-CM

## 2020-09-25 DIAGNOSIS — Z00.8 ENCOUNTER FOR OTHER GENERAL EXAMINATION: ICD-10-CM

## 2020-09-25 PROCEDURE — C8937: CPT

## 2020-09-25 PROCEDURE — 77049 MRI BREAST C-+ W/CAD BI: CPT | Mod: 26

## 2020-09-25 PROCEDURE — A9585: CPT

## 2020-09-25 PROCEDURE — C8908: CPT

## 2020-11-12 ENCOUNTER — OUTPATIENT (OUTPATIENT)
Dept: OUTPATIENT SERVICES | Facility: HOSPITAL | Age: 70
LOS: 1 days | Discharge: HOME | End: 2020-11-12

## 2020-11-12 DIAGNOSIS — N60.29 FIBROADENOSIS OF UNSPECIFIED BREAST: Chronic | ICD-10-CM

## 2020-11-12 DIAGNOSIS — Z90.49 ACQUIRED ABSENCE OF OTHER SPECIFIED PARTS OF DIGESTIVE TRACT: Chronic | ICD-10-CM

## 2020-11-12 DIAGNOSIS — C50.919 MALIGNANT NEOPLASM OF UNSPECIFIED SITE OF UNSPECIFIED FEMALE BREAST: ICD-10-CM

## 2020-11-18 ENCOUNTER — APPOINTMENT (OUTPATIENT)
Dept: HEMATOLOGY ONCOLOGY | Facility: CLINIC | Age: 70
End: 2020-11-18
Payer: MEDICARE

## 2020-11-18 PROCEDURE — 99214 OFFICE O/P EST MOD 30 MIN: CPT | Mod: 95

## 2020-11-18 NOTE — REASON FOR VISIT
[Follow-Up Visit] : a follow-up [Other: _____] : [unfilled] [FreeTextEntry2] : Breast cancer ER/ID + HER-2 -

## 2020-11-18 NOTE — PHYSICAL EXAM
[Restricted in physically strenuous activity but ambulatory and able to carry out work of a light or sedentary nature] : Status 1- Restricted in physically strenuous activity but ambulatory and able to carry out work of a light or sedentary nature, e.g., light house work, office work [Normal] : affect appropriate [de-identified] : Constitutional: well developed, well nourished, in no acute distress. \par Eyes: no icterus seen. no conjunctival injection\par ENT: no tongue swelling, no nasal discharge\par Neck: no visible masses or goitre \par Pulmonary: no audible wheeze,  respirations unlabored\par Musculoskeletal: full range of motion, walking in the house\par Skin: no rash visible on face or upper chest

## 2020-11-18 NOTE — ASSESSMENT
[FreeTextEntry1] : In summary, this is a 69-year-old postmenopausal  lady with stage IA invasive ductal carcinoma of the left breast. T1a, N0, M0. ER positive, IL positive, HER-2/nimco negative. She is status post lumpectomy and sentinel axillary lymph node excision.  Patient has a good performance status and is generally very healthy. She completed RT and started Letrozole July 2016.\par \par - Breast ca: LIZETTE. Tolerating Letrozole very well without significant side effects. Reports good compliance. Continue AI for total of 5-10 years. Breast imaging  3/2020 reviewed\par - Osteopenia: concern for worsening bone density due to anastrozole. Rec to  continue calcium and vit D. DEXA 1-2 yrs. \par - High cholesterol: concern for worsening cholesterol due to anastrozole. Pt is on zocor. Lipid profile annually.\par - Continue to followup with primary care and Gyn\par - BW 3/2020 reviewed\par RTC 6 m.

## 2020-11-18 NOTE — HISTORY OF PRESENT ILLNESS
[Home] : at home, [unfilled] , at the time of the visit. [Medical Office: ([facility])___] : at the medical office located in  [Verbal consent obtained from patient] : the patient, [unfilled] [Date: ____________] : Patient's last distress assessment performed on [unfilled]. [0 - No Distress] : Distress Level: 0 [Patient given social work contact information and resource sheet] : Patient was given social work contact information and resource sheet [de-identified] : Carolyn Busby is a 69-year-old lady who underwent diagnostic mammogram and sonogram on 2/10/16 which showed new left breast distortion and a stereotactic biopsy was recommended. The biopsy was performed at [facility] on 2/22/16 which showed invasive well-differentiated ductal carcinoma with tubular features, Arturo score 4 of 9, 0.3 cm, DCIS low-grade, %, %, HER-2 negative. She underwent breast MRI on 3/3/16 which showed additional 0.7 cm indeterminate small focal area of enhancement in central posterior left breast  and a 0.8 cm enhancing nodule superior central right breast at 12:00.\par She underwent MRI guided biopsy of both lesions. The pathology report from left side showed fibrocystic changes. 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 She underwent bilateral lumpectomies and left sentinel axillary lymph node excision on 3/15/16 which showed invasive ductal carcinoma, measuring 3 mm, grade 1, intermediate grade DCIS in the left breast. 3 out of 3 sentinel lymph nodes were negative. Margins were negative and no lymphovascular invasion was noted. %, %, HER-2/nimco negative. Right breast excision showed flat epithelial atypia and benign changes.\par \par The last bone density scan from 7/10/2014 showed osteopenia with a T score of -1.6 at left femoral neck. \par She refused genetic testing as her niece has been tested negative for deleterious BRCA mutation.\par RT completed June 2016\par  [FreeTextEntry1] : Letrozole started July 2016 [de-identified] : Ms. RENE BARROSO is here for f/u left breast cancer on endocrine therapy since 7/2016\par On letrozole. Good compliance. Has chronic back pain 2/2 bulging discs, which is unchanged, doesn’t need pain meds. No new aches/pain, hot flashes, vag dryness, GI s/e, hair loss. She is using CBD oil. She is active, no change in energy, wt or appetite. Her cholesterol f/b PMD- well controlled. \par Breast imaging with Dr Reddy: 3/2020: BIRADS 2\par DEXA 11/2018- osteopenia (-1.8), cont vit D, high calcium diet\par She got COVID and is recovering. Dint need hospitalization. Stressed out today as  is in cystoscopy

## 2020-11-19 ENCOUNTER — APPOINTMENT (OUTPATIENT)
Dept: RADIOLOGY | Facility: IMAGING CENTER | Age: 70
End: 2020-11-19

## 2020-12-09 ENCOUNTER — APPOINTMENT (OUTPATIENT)
Dept: SURGERY | Facility: CLINIC | Age: 70
End: 2020-12-09
Payer: MEDICARE

## 2020-12-09 PROCEDURE — 99213K: CUSTOM

## 2020-12-29 ENCOUNTER — APPOINTMENT (OUTPATIENT)
Dept: RADIOLOGY | Facility: IMAGING CENTER | Age: 70
End: 2020-12-29
Payer: MEDICARE

## 2020-12-29 ENCOUNTER — OUTPATIENT (OUTPATIENT)
Dept: OUTPATIENT SERVICES | Facility: HOSPITAL | Age: 70
LOS: 1 days | End: 2020-12-29
Payer: MEDICARE

## 2020-12-29 DIAGNOSIS — N60.29 FIBROADENOSIS OF UNSPECIFIED BREAST: Chronic | ICD-10-CM

## 2020-12-29 DIAGNOSIS — Z90.49 ACQUIRED ABSENCE OF OTHER SPECIFIED PARTS OF DIGESTIVE TRACT: Chronic | ICD-10-CM

## 2020-12-29 DIAGNOSIS — C50.919 MALIGNANT NEOPLASM OF UNSPECIFIED SITE OF UNSPECIFIED FEMALE BREAST: ICD-10-CM

## 2020-12-29 PROCEDURE — 77080 DXA BONE DENSITY AXIAL: CPT | Mod: 26

## 2020-12-29 PROCEDURE — 77080 DXA BONE DENSITY AXIAL: CPT

## 2021-03-16 ENCOUNTER — APPOINTMENT (OUTPATIENT)
Dept: UROGYNECOLOGY | Facility: CLINIC | Age: 71
End: 2021-03-16
Payer: MEDICARE

## 2021-03-16 VITALS
TEMPERATURE: 97.1 F | HEIGHT: 64 IN | WEIGHT: 150 LBS | DIASTOLIC BLOOD PRESSURE: 80 MMHG | SYSTOLIC BLOOD PRESSURE: 150 MMHG | BODY MASS INDEX: 25.61 KG/M2

## 2021-03-16 DIAGNOSIS — Z83.3 FAMILY HISTORY OF DIABETES MELLITUS: ICD-10-CM

## 2021-03-16 DIAGNOSIS — R39.89 OTHER SYMPTOMS AND SIGNS INVOLVING THE GENITOURINARY SYSTEM: ICD-10-CM

## 2021-03-16 DIAGNOSIS — Z83.438 FAMILY HISTORY OF OTHER DISORDER OF LIPOPROTEIN METABOLISM AND OTHER LIPIDEMIA: ICD-10-CM

## 2021-03-16 DIAGNOSIS — R31.0 GROSS HEMATURIA: ICD-10-CM

## 2021-03-16 DIAGNOSIS — Z82.49 FAMILY HISTORY OF ISCHEMIC HEART DISEASE AND OTHER DISEASES OF THE CIRCULATORY SYSTEM: ICD-10-CM

## 2021-03-16 DIAGNOSIS — Z83.511 FAMILY HISTORY OF GLAUCOMA: ICD-10-CM

## 2021-03-16 DIAGNOSIS — R35.0 FREQUENCY OF MICTURITION: ICD-10-CM

## 2021-03-16 DIAGNOSIS — Z85.3 PERSONAL HISTORY OF MALIGNANT NEOPLASM OF BREAST: ICD-10-CM

## 2021-03-16 DIAGNOSIS — Z87.19 PERSONAL HISTORY OF OTHER DISEASES OF THE DIGESTIVE SYSTEM: ICD-10-CM

## 2021-03-16 DIAGNOSIS — Z87.39 PERSONAL HISTORY OF OTHER DISEASES OF THE MUSCULOSKELETAL SYSTEM AND CONNECTIVE TISSUE: ICD-10-CM

## 2021-03-16 DIAGNOSIS — Z86.39 PERSONAL HISTORY OF OTHER ENDOCRINE, NUTRITIONAL AND METABOLIC DISEASE: ICD-10-CM

## 2021-03-16 DIAGNOSIS — R39.15 URGENCY OF URINATION: ICD-10-CM

## 2021-03-16 PROCEDURE — 51701 INSERT BLADDER CATHETER: CPT

## 2021-03-16 PROCEDURE — 99204 OFFICE O/P NEW MOD 45 MIN: CPT | Mod: 25

## 2021-03-16 NOTE — HISTORY OF PRESENT ILLNESS
[Urinary Frequency] : no [Feelings Of Urinary Urgency] : moderate [Urinary Tract Infection] : moderate [Pelvic Pain] : moderate [] : days ago [de-identified] : pressure, more than pain [FreeTextEntry1] : \par Carolyn presents with a h/o gross hematuria and bladder pressure which started ~2 weeks ago. She reports symptoms had acute onset and she called her PCP who treated her empirically with antibiotics and scheduled her for urine testing. She reports hematuria resolved after 1 dose of antibiotic however bladder pressure continued. She underwent urine culture after 2 doses of antibiotic and culture negative. She completed one week course of antibiotic and pressure persisted. She underwent repeat urine culture which was negative. She is unsure which antibiotic she was on. She reports her  was recently diagnosed and treated for bladder cancer and is she very concerned. \par \par PMH: h/o breast cancer, HLD, constipation\par PSH: cholecystectomy, lumpectomy\par Social History: , former smoker, retired

## 2021-03-16 NOTE — REASON FOR VISIT
[Questionnaire Received] : Patient questionnaire received [Intake Form Reviewed] : Patient intake form with past medical history, surgical history, family history and social history reviewed today [Blood In Urine] : blood in urine [Pelvic Pain] : pelvic pain

## 2021-03-16 NOTE — PHYSICAL EXAM
[Chaperone Present] : A chaperone was present in the examining room during all aspects of the physical examination [No Lesions] : no lesions were seen on the external genitalia [Labia Majora] : were normal [Atrophy] : atrophy [Normal] : no abnormalities [Exam Deferred] : was deferred [FreeTextEntry1] : General: Well, appearing. Alert and orientated. No acute distress\par HEENT: Normocephalic, atraumatic and extraocular muscles appear to be intact \par Neck: Full range of motion, no obvious lymphadenopathy, deformities, or masses noted \par Respiratory: Speaking in full sentences comfortably, normal work of breathing and no cough during visit\par Musculoskeletal: active full range of motion in extremities \par Extremities: No upper extremity edema noted\par Skin: no obvious rash or skin lesions\par Neuro: Orientated X 3, speech is fluent, normal rate\par Psych: Normal mood and affect \par   [Tenderness] : ~T no ~M abdominal tenderness observed [Distended] : not distended [de-identified] : no significant prolapse

## 2021-03-16 NOTE — DISCUSSION/SUMMARY
[FreeTextEntry1] : Carolyn presents with a h/o gross hematuria and bladder pressure with negative urine culture x 2. I recommend the following: \par -Urine sent today for micro UA and culture\par -Cystoscopy\par -CT Urogram, Rx provided\par -Cystex prn\par -Avoid bladder irritants, fluid modifications reviewed

## 2021-03-17 PROBLEM — Z87.39 HISTORY OF BACKACHE: Status: RESOLVED | Noted: 2021-03-17 | Resolved: 2021-03-17

## 2021-03-17 PROBLEM — Z83.511 FAMILY HISTORY OF GLAUCOMA: Status: ACTIVE | Noted: 2021-03-17

## 2021-03-17 PROBLEM — Z87.19 HISTORY OF CONSTIPATION: Status: RESOLVED | Noted: 2021-03-17 | Resolved: 2021-03-17

## 2021-03-17 PROBLEM — Z83.438 FAMILY HISTORY OF HYPERLIPIDEMIA: Status: ACTIVE | Noted: 2021-03-17

## 2021-03-17 PROBLEM — Z85.3 HISTORY OF MALIGNANT NEOPLASM OF BREAST: Status: RESOLVED | Noted: 2021-03-17 | Resolved: 2021-03-17

## 2021-03-17 PROBLEM — Z82.49 FAMILY HISTORY OF CARDIAC DISORDER: Status: ACTIVE | Noted: 2021-03-17

## 2021-03-17 PROBLEM — Z82.49 FAMILY HISTORY OF HYPERTENSION: Status: ACTIVE | Noted: 2021-03-17

## 2021-03-17 PROBLEM — Z83.3 FAMILY HISTORY OF DIABETES MELLITUS: Status: ACTIVE | Noted: 2021-03-17

## 2021-03-17 PROBLEM — Z86.39 HISTORY OF HYPERLIPIDEMIA: Status: RESOLVED | Noted: 2021-03-17 | Resolved: 2021-03-17

## 2021-03-17 LAB
APPEARANCE: CLEAR
BACTERIA: NEGATIVE
BILIRUBIN URINE: NEGATIVE
BLOOD URINE: NEGATIVE
COLOR: COLORLESS
GLUCOSE QUALITATIVE U: NEGATIVE
HYALINE CASTS: 0 /LPF
KETONES URINE: NEGATIVE
LEUKOCYTE ESTERASE URINE: NEGATIVE
MICROSCOPIC-UA: NORMAL
NITRITE URINE: NEGATIVE
PH URINE: 6
PROTEIN URINE: NEGATIVE
RED BLOOD CELLS URINE: 0 /HPF
SPECIFIC GRAVITY URINE: 1.01
SQUAMOUS EPITHELIAL CELLS: 0 /HPF
UROBILINOGEN URINE: NORMAL
WHITE BLOOD CELLS URINE: 0 /HPF

## 2021-03-18 ENCOUNTER — NON-APPOINTMENT (OUTPATIENT)
Age: 71
End: 2021-03-18

## 2021-03-18 LAB — BACTERIA UR CULT: NORMAL

## 2021-03-26 ENCOUNTER — APPOINTMENT (OUTPATIENT)
Dept: ULTRASOUND IMAGING | Facility: IMAGING CENTER | Age: 71
End: 2021-03-26
Payer: MEDICARE

## 2021-03-26 ENCOUNTER — RESULT REVIEW (OUTPATIENT)
Age: 71
End: 2021-03-26

## 2021-03-26 ENCOUNTER — APPOINTMENT (OUTPATIENT)
Dept: MAMMOGRAPHY | Facility: IMAGING CENTER | Age: 71
End: 2021-03-26
Payer: MEDICARE

## 2021-03-26 ENCOUNTER — OUTPATIENT (OUTPATIENT)
Dept: OUTPATIENT SERVICES | Facility: HOSPITAL | Age: 71
LOS: 1 days | End: 2021-03-26
Payer: MEDICARE

## 2021-03-26 DIAGNOSIS — Z00.8 ENCOUNTER FOR OTHER GENERAL EXAMINATION: ICD-10-CM

## 2021-03-26 DIAGNOSIS — N60.29 FIBROADENOSIS OF UNSPECIFIED BREAST: Chronic | ICD-10-CM

## 2021-03-26 DIAGNOSIS — Z90.49 ACQUIRED ABSENCE OF OTHER SPECIFIED PARTS OF DIGESTIVE TRACT: Chronic | ICD-10-CM

## 2021-03-26 PROCEDURE — 76641 ULTRASOUND BREAST COMPLETE: CPT

## 2021-03-26 PROCEDURE — 76641 ULTRASOUND BREAST COMPLETE: CPT | Mod: 26,50

## 2021-03-26 PROCEDURE — 77066 DX MAMMO INCL CAD BI: CPT | Mod: 26

## 2021-03-26 PROCEDURE — G0279: CPT | Mod: 26

## 2021-03-26 PROCEDURE — 77066 DX MAMMO INCL CAD BI: CPT

## 2021-03-26 PROCEDURE — G0279: CPT

## 2021-03-31 ENCOUNTER — APPOINTMENT (OUTPATIENT)
Dept: UROGYNECOLOGY | Facility: CLINIC | Age: 71
End: 2021-03-31

## 2021-05-18 ENCOUNTER — OUTPATIENT (OUTPATIENT)
Dept: OUTPATIENT SERVICES | Facility: HOSPITAL | Age: 71
LOS: 1 days | Discharge: HOME | End: 2021-05-18

## 2021-05-18 DIAGNOSIS — Z90.49 ACQUIRED ABSENCE OF OTHER SPECIFIED PARTS OF DIGESTIVE TRACT: Chronic | ICD-10-CM

## 2021-05-18 DIAGNOSIS — N60.29 FIBROADENOSIS OF UNSPECIFIED BREAST: Chronic | ICD-10-CM

## 2021-05-18 DIAGNOSIS — C50.919 MALIGNANT NEOPLASM OF UNSPECIFIED SITE OF UNSPECIFIED FEMALE BREAST: ICD-10-CM

## 2021-05-21 ENCOUNTER — APPOINTMENT (OUTPATIENT)
Dept: HEMATOLOGY ONCOLOGY | Facility: CLINIC | Age: 71
End: 2021-05-21
Payer: MEDICARE

## 2021-05-21 PROCEDURE — 99214 OFFICE O/P EST MOD 30 MIN: CPT | Mod: 95

## 2021-05-21 NOTE — PHYSICAL EXAM
[Restricted in physically strenuous activity but ambulatory and able to carry out work of a light or sedentary nature] : Status 1- Restricted in physically strenuous activity but ambulatory and able to carry out work of a light or sedentary nature, e.g., light house work, office work [Normal] : affect appropriate [de-identified] : Constitutional: well developed, well nourished, in no acute distress. \par Eyes: no icterus seen. no conjunctival injection\par ENT: no tongue swelling, no nasal discharge\par Neck: no visible masses or goitre \par Pulmonary: no audible wheeze,  respirations unlabored\par Musculoskeletal: full range of motion, walking in the house\par Skin: no rash visible on face or upper chest

## 2021-05-21 NOTE — CONSULT LETTER
[DrConi  ___] : Dr. TEJEDA a flap of the anterolateral meniscus that seemed to be unstable shaver was able to remove this the remainder limited lateral meniscus was stable upon probing. Patient agreed to contemplate her throughout the lateral compartment. The scope and shaver the past back into the suprapatellar area and the shaver was used to remove any further soft tissue debris. The arthroscopic was removed and the [facility] fluid still 20 mL 0.2% ropivacaine and the portals closed with 4-0 nylon suture. Sterile dressing applied irreparable large 6 inch patient tells midthigh. The tourniquet deflated tourniquet time less than 30 minutes.   Patient was awakened taken to postanesthesia care unit in good condition [DrConi ___] : Dr. TEJEDA

## 2021-05-21 NOTE — HISTORY OF PRESENT ILLNESS
[Home] : at home, [unfilled] , at the time of the visit. [Medical Office: ([facility])___] : at the medical office located in  [Verbal consent obtained from patient] : the patient, [unfilled] [Date: ____________] : Patient's last distress assessment performed on [unfilled]. [0 - No Distress] : Distress Level: 0 [Patient given social work contact information and resource sheet] : Patient was given social work contact information and resource sheet [de-identified] : Carolyn Busby is a 69-year-old lady who underwent diagnostic mammogram and sonogram on 2/10/16 which showed new left breast distortion and a stereotactic biopsy was recommended. 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 She underwent breast MRI on 3/3/16 which showed additional 0.7 cm indeterminate small focal area of enhancement in central posterior left breast  and a 0.8 cm enhancing nodule superior central right breast at 12:00.\par She underwent MRI guided biopsy of both lesions. The pathology report from left side showed fibrocystic changes. 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 She underwent bilateral lumpectomies and left sentinel axillary lymph node excision on 3/15/16 which showed invasive ductal carcinoma, measuring 3 mm, grade 1, intermediate grade DCIS in the left breast. 3 out of 3 sentinel lymph nodes were negative. Margins were negative and no lymphovascular invasion was noted. %, %, HER-2/nimco negative. Right breast excision showed flat epithelial atypia and benign changes.\par \par The last bone density scan from 7/10/2014 showed osteopenia with a T score of -1.6 at left femoral neck. \par She refused genetic testing as her niece has been tested negative for deleterious BRCA mutation.\par RT completed June 2016\par  [FreeTextEntry1] : Letrozole started July 2016 [de-identified] : Ms. RENE BARROSO is here for f/u left breast cancer on endocrine therapy since 7/2016\par On letrozole. Good compliance. Has chronic back pain 2/2 bulging discs, which is unchanged, doesn’t need pain meds. No new aches/pain, hot flashes, vag dryness, GI s/e, hair loss. She is using CBD oil. She is active, no change in energy, wt or appetite. Her cholesterol f/b PMD- well controlled. \par Breast imaging with Dr Reddy: 3/2021: BIRADS 2\par DEXA 11/2018- osteopenia (-1.8), 12/2020 osteopenia (-1.7) cont vit D, high calcium diet\par She got COVID and is recovering. Dint need hospitalization. Stressed out today as  underwent surgery for bladder cancer. s/p both vaccine\par She had bad UTI, with hematuria 3/2021. Cystoscopy and CT A/P LZIETTE. Sx resolved with hydration\par She reports she had bordeline PTH 98, ca 10.4 3/2021. She is seeing endo. \par She will be 5 years out in 7/2021. We discussed she had 3mm NODE NEG and I recommend to stop it. Patient is very nervous about stopping it. She has minimal toxicity. We maddison continue for 7 years ( 7/2023)

## 2021-09-30 ENCOUNTER — RESULT REVIEW (OUTPATIENT)
Age: 71
End: 2021-09-30

## 2021-09-30 ENCOUNTER — APPOINTMENT (OUTPATIENT)
Dept: MRI IMAGING | Facility: IMAGING CENTER | Age: 71
End: 2021-09-30
Payer: MEDICARE

## 2021-09-30 ENCOUNTER — OUTPATIENT (OUTPATIENT)
Dept: OUTPATIENT SERVICES | Facility: HOSPITAL | Age: 71
LOS: 1 days | End: 2021-09-30
Payer: MEDICARE

## 2021-09-30 DIAGNOSIS — Z90.49 ACQUIRED ABSENCE OF OTHER SPECIFIED PARTS OF DIGESTIVE TRACT: Chronic | ICD-10-CM

## 2021-09-30 DIAGNOSIS — Z00.8 ENCOUNTER FOR OTHER GENERAL EXAMINATION: ICD-10-CM

## 2021-09-30 DIAGNOSIS — N60.29 FIBROADENOSIS OF UNSPECIFIED BREAST: Chronic | ICD-10-CM

## 2021-09-30 PROCEDURE — C8937: CPT

## 2021-09-30 PROCEDURE — C8908: CPT | Mod: MH

## 2021-09-30 PROCEDURE — A9585: CPT

## 2021-09-30 PROCEDURE — 77049 MRI BREAST C-+ W/CAD BI: CPT | Mod: 26,MH

## 2021-10-19 ENCOUNTER — NON-APPOINTMENT (OUTPATIENT)
Age: 71
End: 2021-10-19

## 2021-12-06 ENCOUNTER — OUTPATIENT (OUTPATIENT)
Dept: OUTPATIENT SERVICES | Facility: HOSPITAL | Age: 71
LOS: 1 days | Discharge: HOME | End: 2021-12-06

## 2021-12-06 ENCOUNTER — APPOINTMENT (OUTPATIENT)
Dept: SURGERY | Facility: CLINIC | Age: 71
End: 2021-12-06
Payer: MEDICARE

## 2021-12-06 DIAGNOSIS — N60.29 FIBROADENOSIS OF UNSPECIFIED BREAST: Chronic | ICD-10-CM

## 2021-12-06 DIAGNOSIS — Z90.49 ACQUIRED ABSENCE OF OTHER SPECIFIED PARTS OF DIGESTIVE TRACT: Chronic | ICD-10-CM

## 2021-12-06 DIAGNOSIS — C50.919 MALIGNANT NEOPLASM OF UNSPECIFIED SITE OF UNSPECIFIED FEMALE BREAST: ICD-10-CM

## 2021-12-06 PROCEDURE — 99213K: CUSTOM

## 2021-12-08 ENCOUNTER — APPOINTMENT (OUTPATIENT)
Dept: HEMATOLOGY ONCOLOGY | Facility: CLINIC | Age: 71
End: 2021-12-08
Payer: MEDICARE

## 2021-12-08 VITALS
TEMPERATURE: 97.8 F | DIASTOLIC BLOOD PRESSURE: 71 MMHG | RESPIRATION RATE: 16 BRPM | SYSTOLIC BLOOD PRESSURE: 132 MMHG | WEIGHT: 149.91 LBS | HEART RATE: 88 BPM | BODY MASS INDEX: 25.59 KG/M2 | HEIGHT: 64.02 IN | OXYGEN SATURATION: 96 %

## 2021-12-08 PROCEDURE — 99214 OFFICE O/P EST MOD 30 MIN: CPT

## 2021-12-08 NOTE — PHYSICAL EXAM
[Restricted in physically strenuous activity but ambulatory and able to carry out work of a light or sedentary nature] : Status 1- Restricted in physically strenuous activity but ambulatory and able to carry out work of a light or sedentary nature, e.g., light house work, office work [Normal] : normal appearance, no rash, nodules, vesicles, ulcers, erythema [de-identified] : healed lumpectomy scar

## 2021-12-08 NOTE — HISTORY OF PRESENT ILLNESS
[Date: ____________] : Patient's last distress assessment performed on [unfilled]. [0 - No Distress] : Distress Level: 0 [Patient given social work contact information and resource sheet] : Patient was given social work contact information and resource sheet [Home] : at home, [unfilled] , at the time of the visit. [Medical Office: ([facility])___] : at the medical office located in  [Verbal consent obtained from patient] : the patient, [unfilled] [de-identified] : Carolyn Busby is a 69-year-old lady who underwent diagnostic mammogram and sonogram on 2/10/16 which showed new left breast distortion and a stereotactic biopsy was recommended. 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 She underwent breast MRI on 3/3/16 which showed additional 0.7 cm indeterminate small focal area of enhancement in central posterior left breast  and a 0.8 cm enhancing nodule superior central right breast at 12:00.\par She underwent MRI guided biopsy of both lesions. The pathology report from left side showed fibrocystic changes. 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 She underwent bilateral lumpectomies and left sentinel axillary lymph node excision on 3/15/16 which showed invasive ductal carcinoma, measuring 3 mm, grade 1, intermediate grade DCIS in the left breast. 3 out of 3 sentinel lymph nodes were negative. Margins were negative and no lymphovascular invasion was noted. %, %, HER-2/nimco negative. Right breast excision showed flat epithelial atypia and benign changes.\par \par The last bone density scan from 7/10/2014 showed osteopenia with a T score of -1.6 at left femoral neck. \par She refused genetic testing as her niece has been tested negative for deleterious BRCA mutation.\par RT completed June 2016\par  [FreeTextEntry1] : Letrozole started July 2016 [de-identified] : Ms. RENE BARROSO is here for f/u left breast cancer on endocrine therapy since 7/2016\par On letrozole. Good compliance. Has chronic back pain 2/2 bulging discs, which is unchanged, doesn’t need pain meds. No new aches/pain, hot flashes, vag dryness, GI s/e, hair loss. She is using CBD oil. She is active, no change in energy, wt or appetite. Her cholesterol f/b PMD- well controlled. \par Breast imaging with Dr Reddy: 3/2021: BIRADS 2, MRI 9/2021\par DEXA 11/2018- osteopenia (-1.8), 12/2020 osteopenia (-1.7) cont vit D, high calcium diet\par She got COVID and is recovering. Dint need hospitalization. Stressed out today as  underwent surgery for bladder cancer. s/p both vaccine. \par She had bad UTI, with hematuria 3/2021. Cystoscopy and CT A/P LIZETTE. Sx resolved with hydration\par She reports she had bordeline PTH 98, ca 10.4 3/2021. She is seeing endo. \par She will be 5 years out in 7/2021. We discussed she had 3mm NODE NEG and I recommend to stop it. Patient is very nervous about stopping it. She has minimal toxicity. We maddison continue for 7 years ( 7/2023)

## 2021-12-08 NOTE — REASON FOR VISIT
[Follow-Up Visit] : a follow-up [Other: _____] : [unfilled] [FreeTextEntry2] : Breast cancer ER/[place] + HER-2 -

## 2021-12-08 NOTE — ASSESSMENT
[FreeTextEntry1] : In summary, this is a 71-year-old postmenopausal  lady with stage IA invasive ductal carcinoma of the left breast. T1a, N0, M0. ER positive, IA positive, HER-2/nimco negative. She is status post lumpectomy and sentinel axillary lymph node excision.  Patient has a good performance status and is generally very healthy. She completed RT and started Letrozole July 2016.\par \par - Breast ca:  Tolerating Letrozole very well without significant side effects. Reports good compliance. Continue AI for total of 5-7 years. Breast imaging  3/2021, 9/2021 reviewed\par She will be 5 years out in 7/2021. We discussed she had 3mm NODE NEG and I recommend to stop it. Patient is very nervous about stopping it. She has minimal toxicity. We maddison continue for 7 years ( 7/2023)\par - Osteopenia: concern for worsening bone density due to anastrozole. Rec to  continue calcium and vit D. DEXA 1-2 yrs. \par - High cholesterol: concern for worsening cholesterol due to anastrozole. Pt is on zocor. Lipid profile annually.\par - Continue to followup with primary care and Gyn\par - BW 3/2021 reviewed from Dr Ann's office \par - High PTH, normal calcium: Monitor calcium, referred to see endo\par - She got COVID and is recovering. Dint need hospitalization. Stressed out today as  underwent surgery for bladder cancer. s/p both vaccine\par - She had bad UTI, with hematuria 3/2021. Cystoscopy and CT A/P LIZETTE. Sx resolved with hydration\par RTC 6 m.

## 2021-12-09 ENCOUNTER — NON-APPOINTMENT (OUTPATIENT)
Age: 71
End: 2021-12-09

## 2022-03-31 ENCOUNTER — OUTPATIENT (OUTPATIENT)
Dept: OUTPATIENT SERVICES | Facility: HOSPITAL | Age: 72
LOS: 1 days | End: 2022-03-31
Payer: MEDICARE

## 2022-03-31 ENCOUNTER — APPOINTMENT (OUTPATIENT)
Dept: MAMMOGRAPHY | Facility: IMAGING CENTER | Age: 72
End: 2022-03-31
Payer: MEDICARE

## 2022-03-31 ENCOUNTER — APPOINTMENT (OUTPATIENT)
Dept: ULTRASOUND IMAGING | Facility: IMAGING CENTER | Age: 72
End: 2022-03-31
Payer: MEDICARE

## 2022-03-31 DIAGNOSIS — Z90.49 ACQUIRED ABSENCE OF OTHER SPECIFIED PARTS OF DIGESTIVE TRACT: Chronic | ICD-10-CM

## 2022-03-31 DIAGNOSIS — Z00.8 ENCOUNTER FOR OTHER GENERAL EXAMINATION: ICD-10-CM

## 2022-03-31 DIAGNOSIS — N60.29 FIBROADENOSIS OF UNSPECIFIED BREAST: Chronic | ICD-10-CM

## 2022-03-31 PROCEDURE — 76641 ULTRASOUND BREAST COMPLETE: CPT | Mod: 26,50

## 2022-03-31 PROCEDURE — G0279: CPT

## 2022-03-31 PROCEDURE — 77066 DX MAMMO INCL CAD BI: CPT | Mod: 26

## 2022-03-31 PROCEDURE — G0279: CPT | Mod: 26

## 2022-03-31 PROCEDURE — 76641 ULTRASOUND BREAST COMPLETE: CPT

## 2022-03-31 PROCEDURE — 77066 DX MAMMO INCL CAD BI: CPT

## 2022-06-08 ENCOUNTER — OUTPATIENT (OUTPATIENT)
Dept: OUTPATIENT SERVICES | Facility: HOSPITAL | Age: 72
LOS: 1 days | Discharge: HOME | End: 2022-06-08

## 2022-06-08 DIAGNOSIS — N60.29 FIBROADENOSIS OF UNSPECIFIED BREAST: Chronic | ICD-10-CM

## 2022-06-08 DIAGNOSIS — C50.919 MALIGNANT NEOPLASM OF UNSPECIFIED SITE OF UNSPECIFIED FEMALE BREAST: ICD-10-CM

## 2022-06-08 DIAGNOSIS — Z90.49 ACQUIRED ABSENCE OF OTHER SPECIFIED PARTS OF DIGESTIVE TRACT: Chronic | ICD-10-CM

## 2022-06-15 ENCOUNTER — APPOINTMENT (OUTPATIENT)
Dept: HEMATOLOGY ONCOLOGY | Facility: CLINIC | Age: 72
End: 2022-06-15
Payer: MEDICARE

## 2022-06-15 VITALS
WEIGHT: 145.48 LBS | RESPIRATION RATE: 17 BRPM | TEMPERATURE: 97.3 F | OXYGEN SATURATION: 99 % | HEART RATE: 86 BPM | BODY MASS INDEX: 24.84 KG/M2 | HEIGHT: 64.02 IN

## 2022-06-15 PROCEDURE — 99214 OFFICE O/P EST MOD 30 MIN: CPT

## 2022-06-15 NOTE — ASSESSMENT
[FreeTextEntry1] : In summary, this is a 71-year-old postmenopausal  lady with stage IA invasive ductal carcinoma of the left breast. T1a, N0, M0. ER positive, FL positive, HER-2/nimco negative. She is status post lumpectomy and sentinel axillary lymph node excision.  Patient has a good performance status and is generally very healthy. She completed RT and started Letrozole July 2016.\par \par - Breast ca:  Tolerating Letrozole very well without significant side effects. Reports good compliance. Continue AI for total of 5-7 years. Breast imaging  3/2021, 9/2021, 3/2022 reviewed\par She will be 5 years out in 7/2021. We discussed she had 3mm NODE NEG and I recommend to stop it. Patient is very nervous about stopping it. She has minimal toxicity. We maddison continue for 7 years ( 7/2023)\par - Osteopenia: concern for worsening bone density due to anastrozole. Rec to  continue calcium and vit D. DEXA 1-2 yrs. \par - High cholesterol: concern for worsening cholesterol due to anastrozole. Pt is on zocor. Lipid profile annually.\par - Continue to followup with primary care and Gyn\par - BW 3/2022 reviewed from Dr Ann's office \par - High PTH, normal calcium: Monitor calcium, referred to see endo\par \par RTC 6 m.

## 2022-06-15 NOTE — PHYSICAL EXAM
[Restricted in physically strenuous activity but ambulatory and able to carry out work of a light or sedentary nature] : Status 1- Restricted in physically strenuous activity but ambulatory and able to carry out work of a light or sedentary nature, e.g., light house work, office work [Normal] : affect appropriate [de-identified] : healed lumpectomy scar

## 2022-06-15 NOTE — REASON FOR VISIT
[Follow-Up Visit] : a follow-up [Other: _____] : [unfilled] [FreeTextEntry2] : Breast cancer ER/[place] + HER-2 -

## 2022-06-15 NOTE — OB HISTORY
[Definite:  ___ (Date)] : the last menstrual period was [unfilled] [Menarche Age: ____] : age at menarche was [unfilled] [Currently In Menopause] : currently in menopause [Experiencing Menopausal Sxs] : not experiencing menopausal symptoms [___] : Living: [unfilled]

## 2022-06-15 NOTE — HISTORY OF PRESENT ILLNESS
[de-identified] : Carolyn Busby is a 69-year-old lady who underwent diagnostic mammogram and sonogram on 2/10/16 which showed new left breast distortion and a stereotactic biopsy was recommended. 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 She underwent breast MRI on 3/3/16 which showed additional 0.7 cm indeterminate small focal area of enhancement in central posterior left breast  and a 0.8 cm enhancing nodule superior central right breast at 12:00.\par She underwent MRI guided biopsy of both lesions. The pathology report from left side showed fibrocystic changes. 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 She underwent bilateral lumpectomies and left sentinel axillary lymph node excision on 3/15/16 which showed invasive ductal carcinoma, measuring 3 mm, grade 1, intermediate grade DCIS in the left breast. 3 out of 3 sentinel lymph nodes were negative. Margins were negative and no lymphovascular invasion was noted. %, %, HER-2/nimco negative. Right breast excision showed flat epithelial atypia and benign changes.\par \par The last bone density scan from 7/10/2014 showed osteopenia with a T score of -1.6 at left femoral neck. \par She refused genetic testing as her niece has been tested negative for deleterious BRCA mutation.\par RT completed June 2016\par  [FreeTextEntry1] : Letrozole started July 2016 [de-identified] : Ms. RENE BARROSO is here for f/u left breast cancer on endocrine therapy since 7/2016\par \par On letrozole. Good compliance. Has chronic back pain 2/2 bulging discs, which is unchanged, doesn’t need pain meds. No new aches/pain, hot flashes, vag dryness, GI s/e, hair loss. She is using CBD oil. She is active, no change in energy, wt or appetite. Her cholesterol f/b PMD- well controlled. \par Breast imaging with Dr Reddy: 3/2021: BIRADS 2, MRI 9/2021, 3/2022 birads 2\par DEXA 11/2018- osteopenia (-1.8), 12/2020 osteopenia (-1.7) cont vit D, high calcium diet. She is seeing endo for high calcium and PTH. \par She got COVID and is recovering. Dint need hospitalization. Stressed out today as  underwent surgery for bladder cancer. s/p both vaccine. \par She had bad UTI, with hematuria 3/2021. Cystoscopy and CT A/P LIZETTE. Sx resolved with hydration\par \par She will be 5 years out in 7/2021. We discussed she had 3mm NODE NEG and I recommend to stop it. Patient is very nervous about stopping it. She has minimal toxicity. We maddison continue for 7 years ( 7/2023) [Date: ____________] : Patient's last distress assessment performed on [unfilled]. [0 - No Distress] : Distress Level: 0 [Patient given social work contact information and resource sheet] : Patient was given social work contact information and resource sheet

## 2022-09-29 ENCOUNTER — OUTPATIENT (OUTPATIENT)
Dept: OUTPATIENT SERVICES | Facility: HOSPITAL | Age: 72
LOS: 1 days | End: 2022-09-29
Payer: MEDICARE

## 2022-09-29 ENCOUNTER — APPOINTMENT (OUTPATIENT)
Dept: MRI IMAGING | Facility: IMAGING CENTER | Age: 72
End: 2022-09-29

## 2022-09-29 DIAGNOSIS — Z90.49 ACQUIRED ABSENCE OF OTHER SPECIFIED PARTS OF DIGESTIVE TRACT: Chronic | ICD-10-CM

## 2022-09-29 DIAGNOSIS — N60.29 FIBROADENOSIS OF UNSPECIFIED BREAST: Chronic | ICD-10-CM

## 2022-09-29 DIAGNOSIS — Z00.8 ENCOUNTER FOR OTHER GENERAL EXAMINATION: ICD-10-CM

## 2022-09-29 PROCEDURE — 77049 MRI BREAST C-+ W/CAD BI: CPT | Mod: 26,MH

## 2022-09-29 PROCEDURE — A9585: CPT

## 2022-09-29 PROCEDURE — C8908: CPT | Mod: MH

## 2022-09-29 PROCEDURE — C8937: CPT

## 2022-12-07 ENCOUNTER — APPOINTMENT (OUTPATIENT)
Dept: SURGERY | Facility: CLINIC | Age: 72
End: 2022-12-07

## 2022-12-07 PROCEDURE — 99213K: CUSTOM

## 2022-12-09 ENCOUNTER — OUTPATIENT (OUTPATIENT)
Dept: OUTPATIENT SERVICES | Facility: HOSPITAL | Age: 72
LOS: 1 days | Discharge: HOME | End: 2022-12-09

## 2022-12-09 DIAGNOSIS — N60.29 FIBROADENOSIS OF UNSPECIFIED BREAST: Chronic | ICD-10-CM

## 2022-12-09 DIAGNOSIS — C50.919 MALIGNANT NEOPLASM OF UNSPECIFIED SITE OF UNSPECIFIED FEMALE BREAST: ICD-10-CM

## 2022-12-09 DIAGNOSIS — Z90.49 ACQUIRED ABSENCE OF OTHER SPECIFIED PARTS OF DIGESTIVE TRACT: Chronic | ICD-10-CM

## 2022-12-16 ENCOUNTER — APPOINTMENT (OUTPATIENT)
Dept: HEMATOLOGY ONCOLOGY | Facility: CLINIC | Age: 72
End: 2022-12-16

## 2022-12-16 PROCEDURE — 99213 OFFICE O/P EST LOW 20 MIN: CPT | Mod: 95

## 2022-12-16 NOTE — ASSESSMENT
[FreeTextEntry1] : In summary, this is a 71-year-old postmenopausal  lady with stage IA invasive ductal carcinoma of the left breast. T1a, N0, M0. ER positive, SC positive, HER-2/nimco negative. She is status post lumpectomy and sentinel axillary lymph node excision.  Patient has a good performance status and is generally very healthy. She completed RT and started Letrozole July 2016.\par \par - Breast ca:  Tolerating Letrozole very well without significant side effects. Reports good compliance. Continue AI for total of 5-7 years. Breast imaging  3/2021, 9/2021, 3/2022 reviewed\par She is 5 years out in 7/2021. We discussed she had 3mm NODE NEG and I recommend to stop it. Patient is very nervous about stopping it. She has minimal toxicity. We maddison continue for 7 years ( 7/2023)\par - Osteopenia: concern for worsening bone density due to anastrozole. Rec to  continue calcium and vit D. DEXA 1-2 yrs. \par - High cholesterol: concern for worsening cholesterol due to anastrozole. Pt is on zocor. Lipid profile annually.\par - Continue to followup with primary care and Gyn\par - BW 3/2022 reviewed from Dr Ann's office \par - High PTH, normal calcium: Monitor calcium, referred to see endo\par \par RTC 6 m.

## 2022-12-16 NOTE — HISTORY OF PRESENT ILLNESS
[de-identified] : Carolyn Busby is a 69-year-old lady who underwent diagnostic mammogram and sonogram on 2/10/16 which showed new left breast distortion and a stereotactic biopsy was recommended. 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 She underwent breast MRI on 3/3/16 which showed additional 0.7 cm indeterminate small focal area of enhancement in central posterior left breast  and a 0.8 cm enhancing nodule superior central right breast at 12:00.\par She underwent MRI guided biopsy of both lesions. The pathology report from left side showed fibrocystic changes. 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 She underwent bilateral lumpectomies and left sentinel axillary lymph node excision on 3/15/16 which showed invasive ductal carcinoma, measuring 3 mm, grade 1, intermediate grade DCIS in the left breast. 3 out of 3 sentinel lymph nodes were negative. Margins were negative and no lymphovascular invasion was noted. %, %, HER-2/nimco negative. Right breast excision showed flat epithelial atypia and benign changes.\par \par The last bone density scan from 7/10/2014 showed osteopenia with a T score of -1.6 at left femoral neck. \par She refused genetic testing as her niece has been tested negative for deleterious BRCA mutation.\par RT completed June 2016\par  [FreeTextEntry1] : Letrozole started July 2016 [de-identified] : Ms. RENE BARROSO is here for f/u left breast cancer on endocrine therapy since 7/2016\par \par On letrozole. Good compliance. Has chronic back pain 2/2 bulging discs, which is unchanged, doesn’t need pain meds. No new aches/pain, hot flashes, vag dryness, GI s/e, hair loss. She is using CBD oil. She is active, no change in energy, wt or appetite. Her cholesterol f/b PMD- well controlled. \par Breast imaging with Dr Reddy: 3/2021: BIRADS 2, MRI 9/2021, 3/2022 birads 2\par DEXA 11/2018- osteopenia (-1.8), 12/2020 osteopenia (-1.7) cont vit D, high calcium diet. She is seeing endo for high calcium and PTH. \par She is 5 years out in 7/2021. We discussed she had 3mm NODE NEG and I recommend to stop it. Patient is very nervous about stopping it. She has minimal toxicity. We maddison continue for 7 years ( 7/2023)\par  [Date: ____________] : Patient's last distress assessment performed on [unfilled]. [0 - No Distress] : Distress Level: 0 [Patient given social work contact information and resource sheet] : Patient was given social work contact information and resource sheet

## 2022-12-16 NOTE — REASON FOR VISIT
[Home] : at home, [unfilled] , at the time of the visit. [Medical Office: ([facility])___] : at the medical office located in  [Patient] : the patient [Follow-Up Visit] : a follow-up [Other: _____] : [unfilled] [FreeTextEntry2] : Breast cancer ER/[place] + HER-2 -

## 2022-12-16 NOTE — PHYSICAL EXAM
[Restricted in physically strenuous activity but ambulatory and able to carry out work of a light or sedentary nature] : Status 1- Restricted in physically strenuous activity but ambulatory and able to carry out work of a light or sedentary nature, e.g., light house work, office work [de-identified] : no exam today [de-identified] : healed lumpectomy scar

## 2022-12-30 ENCOUNTER — APPOINTMENT (OUTPATIENT)
Dept: RADIOLOGY | Facility: IMAGING CENTER | Age: 72
End: 2022-12-30
Payer: MEDICARE

## 2022-12-30 ENCOUNTER — OUTPATIENT (OUTPATIENT)
Dept: OUTPATIENT SERVICES | Facility: HOSPITAL | Age: 72
LOS: 1 days | End: 2022-12-30
Payer: MEDICARE

## 2022-12-30 DIAGNOSIS — Z79.811 LONG TERM (CURRENT) USE OF AROMATASE INHIBITORS: ICD-10-CM

## 2022-12-30 PROCEDURE — 77080 DXA BONE DENSITY AXIAL: CPT | Mod: 26

## 2022-12-30 PROCEDURE — 77080 DXA BONE DENSITY AXIAL: CPT

## 2023-01-04 ENCOUNTER — NON-APPOINTMENT (OUTPATIENT)
Age: 73
End: 2023-01-04

## 2023-01-05 ENCOUNTER — NON-APPOINTMENT (OUTPATIENT)
Age: 73
End: 2023-01-05

## 2023-01-20 ENCOUNTER — APPOINTMENT (OUTPATIENT)
Dept: HEMATOLOGY ONCOLOGY | Facility: CLINIC | Age: 73
End: 2023-01-20
Payer: MEDICARE

## 2023-01-20 ENCOUNTER — APPOINTMENT (OUTPATIENT)
Dept: HEMATOLOGY ONCOLOGY | Facility: CLINIC | Age: 73
End: 2023-01-20

## 2023-01-20 PROCEDURE — 99213 OFFICE O/P EST LOW 20 MIN: CPT | Mod: 95

## 2023-01-20 NOTE — REASON FOR VISIT
[Home] : at home, [unfilled] , at the time of the visit. [Medical Office: ([facility])___] : at the medical office located in  [Patient] : the patient [Follow-Up Visit] : a follow-up [Other: _____] : [unfilled] [FreeTextEntry2] : Breast cancer ER/[place] + HER-2 -

## 2023-01-20 NOTE — ASSESSMENT
[FreeTextEntry1] : In summary, this is a 71-year-old postmenopausal  lady with stage IA invasive ductal carcinoma of the left breast. T1a, N0, M0. ER positive, RI positive, HER-2/nimco negative. She is status post lumpectomy and sentinel axillary lymph node excision.  Patient has a good performance status and is generally very healthy. She completed RT and started Letrozole July 2016.\par \par 1/2023\par Pt was seen last month\par Has been on letrozole for 6.5 yrs\par Reports worsening pain\par Off letrozole x 2 weeks\par no significant improvement in pain\par Rec bone scan\par She reports she is very anxious and wants to hold off x 2 more weeks\par She will hold x 4 weeks total and see me in person. If no significant improvement, would rec to do a bone scan to r/o mets \par \par rto 2/10 at noon rpa

## 2023-01-20 NOTE — PHYSICAL EXAM
[Restricted in physically strenuous activity but ambulatory and able to carry out work of a light or sedentary nature] : Status 1- Restricted in physically strenuous activity but ambulatory and able to carry out work of a light or sedentary nature, e.g., light house work, office work [de-identified] : no exam [de-identified] : healed lumpectomy scar

## 2023-01-20 NOTE — HISTORY OF PRESENT ILLNESS
[Date: ____________] : Patient's last distress assessment performed on [unfilled]. [0 - No Distress] : Distress Level: 0 [Patient given social work contact information and resource sheet] : Patient was given social work contact information and resource sheet [de-identified] : Carolyn Busby is a 69-year-old lady who underwent diagnostic mammogram and sonogram on 2/10/16 which showed new left breast distortion and a stereotactic biopsy was recommended. 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 She underwent breast MRI on 3/3/16 which showed additional 0.7 cm indeterminate small focal area of enhancement in central posterior left breast  and a 0.8 cm enhancing nodule superior central right breast at 12:00.\par She underwent MRI guided biopsy of both lesions. The pathology report from left side showed fibrocystic changes. 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 She underwent bilateral lumpectomies and left sentinel axillary lymph node excision on 3/15/16 which showed invasive ductal carcinoma, measuring 3 mm, grade 1, intermediate grade DCIS in the left breast. 3 out of 3 sentinel lymph nodes were negative. Margins were negative and no lymphovascular invasion was noted. %, %, HER-2/nimco negative. Right breast excision showed flat epithelial atypia and benign changes.\par \par The last bone density scan from 7/10/2014 showed osteopenia with a T score of -1.6 at left femoral neck. \par She refused genetic testing as her niece has been tested negative for deleterious BRCA mutation.\par RT completed June 2016\par  [FreeTextEntry1] : Letrozole started July 2016 [de-identified] : Ms. RENE BARROSO is here for f/u left breast cancer on endocrine therapy since 7/2016\par \par 1/2023\par Pt was seen last month\par Has been on letrozole for 6.5 yrs\par Reports worsening pain\par Off letrozole x 2 weeks\par no significant improvement in pain\par Rec bone scan\par She reports she is very anxious and wants to hold off x 2 more weeks\par She will hold x 4 weeks total and see me in person. If no significant improvement, would rec to do a bone scan to r/o mets\par \par 12/16/2022\par \par On letrozole. Good compliance. Has chronic back pain 2/2 bulging discs, which is unchanged, doesn’t need pain meds. No new aches/pain, hot flashes, vag dryness, GI s/e, hair loss. She is using CBD oil. She is active, no change in energy, wt or appetite. Her cholesterol f/b PMD- well controlled. \par Breast imaging with Dr Reddy: 3/2021: BIRADS 2, MRI 9/2021, 3/2022 birads 2\par DEXA 11/2018- osteopenia (-1.8), 12/2020 osteopenia (-1.7) cont vit D, high calcium diet. She is seeing endo for high calcium and PTH. \par She got COVID and is recovering. Dint need hospitalization. Stressed out today as  underwent surgery for bladder cancer. s/p both vaccine. \par She had bad UTI, with hematuria 3/2021. Cystoscopy and CT A/P LIZETTE. Sx resolved with hydration\par \par She will be 5 years out in 7/2021. We discussed she had 3mm NODE NEG and I recommend to stop it. Patient is very nervous about stopping it. She has minimal toxicity. We maddison continue for 7 years ( 7/2023)

## 2023-04-06 ENCOUNTER — APPOINTMENT (OUTPATIENT)
Dept: ULTRASOUND IMAGING | Facility: IMAGING CENTER | Age: 73
End: 2023-04-06
Payer: MEDICARE

## 2023-04-06 ENCOUNTER — APPOINTMENT (OUTPATIENT)
Dept: MAMMOGRAPHY | Facility: IMAGING CENTER | Age: 73
End: 2023-04-06
Payer: MEDICARE

## 2023-04-06 ENCOUNTER — OUTPATIENT (OUTPATIENT)
Dept: OUTPATIENT SERVICES | Facility: HOSPITAL | Age: 73
LOS: 1 days | End: 2023-04-06
Payer: MEDICARE

## 2023-04-06 DIAGNOSIS — Z90.49 ACQUIRED ABSENCE OF OTHER SPECIFIED PARTS OF DIGESTIVE TRACT: Chronic | ICD-10-CM

## 2023-04-06 DIAGNOSIS — Z00.8 ENCOUNTER FOR OTHER GENERAL EXAMINATION: ICD-10-CM

## 2023-04-06 DIAGNOSIS — N60.29 FIBROADENOSIS OF UNSPECIFIED BREAST: Chronic | ICD-10-CM

## 2023-04-06 PROCEDURE — 77067 SCR MAMMO BI INCL CAD: CPT | Mod: 26

## 2023-04-06 PROCEDURE — 76641 ULTRASOUND BREAST COMPLETE: CPT | Mod: 26,50,GA

## 2023-04-06 PROCEDURE — 77063 BREAST TOMOSYNTHESIS BI: CPT | Mod: 26

## 2023-04-06 PROCEDURE — 77067 SCR MAMMO BI INCL CAD: CPT

## 2023-04-06 PROCEDURE — 77063 BREAST TOMOSYNTHESIS BI: CPT

## 2023-04-06 PROCEDURE — 76641 ULTRASOUND BREAST COMPLETE: CPT

## 2023-06-13 ENCOUNTER — OUTPATIENT (OUTPATIENT)
Dept: OUTPATIENT SERVICES | Facility: HOSPITAL | Age: 73
LOS: 1 days | Discharge: HOME | End: 2023-06-13

## 2023-06-13 DIAGNOSIS — Z90.49 ACQUIRED ABSENCE OF OTHER SPECIFIED PARTS OF DIGESTIVE TRACT: Chronic | ICD-10-CM

## 2023-06-13 DIAGNOSIS — N60.29 FIBROADENOSIS OF UNSPECIFIED BREAST: Chronic | ICD-10-CM

## 2023-06-13 DIAGNOSIS — C50.919 MALIGNANT NEOPLASM OF UNSPECIFIED SITE OF UNSPECIFIED FEMALE BREAST: ICD-10-CM

## 2023-06-23 ENCOUNTER — APPOINTMENT (OUTPATIENT)
Dept: HEMATOLOGY ONCOLOGY | Facility: CLINIC | Age: 73
End: 2023-06-23
Payer: MEDICARE

## 2023-06-23 DIAGNOSIS — E78.00 PURE HYPERCHOLESTEROLEMIA, UNSPECIFIED: ICD-10-CM

## 2023-06-23 DIAGNOSIS — Z79.811 LONG TERM (CURRENT) USE OF AROMATASE INHIBITORS: ICD-10-CM

## 2023-06-23 DIAGNOSIS — M85.80 OTHER SPECIFIED DISORDERS OF BONE DENSITY AND STRUCTURE, UNSPECIFIED SITE: ICD-10-CM

## 2023-06-23 DIAGNOSIS — M19.90 UNSPECIFIED OSTEOARTHRITIS, UNSPECIFIED SITE: ICD-10-CM

## 2023-06-23 PROCEDURE — 99214 OFFICE O/P EST MOD 30 MIN: CPT

## 2023-06-23 NOTE — ASSESSMENT
[FreeTextEntry1] : In summary, this is a 71-year-old postmenopausal lady with stage IA invasive ductal carcinoma of the left breast. T1a, N0, M0. ER positive, NV positive, HER-2/nimco negative. She is status post lumpectomy and sentinel axillary lymph node excision. Patient has a good performance status and is generally very healthy. She completed RT and started Letrozole July 2016.\par \par - Breast ca: Tolerating Letrozole very well without significant side effects. Reports good compliance. Continue AI for total of 5-7 years. Breast imaging 3/2021, 9/2021, 3/2022 reviewed\par She will be 5 years out in 7/2021. We discussed she had 3mm NODE NEG and I recommend to stop it. Patient is very nervous about stopping it. She has minimal toxicity. We maddison continue for 7 years ( 7/2023)\par - Osteopenia: concern for worsening bone density due to anastrozole. Rec to continue calcium and vit D. DEXA 1-2 yrs. \par - High cholesterol: concern for worsening cholesterol due to anastrozole. Pt is on zocor. Lipid profile annually.\par - Continue to followup with primary care and Gyn\par - BW 3/2022 reviewed from Dr Ann's office \par - High PTH, normal calcium: Monitor calcium, referred to see endo\par \par RTC 6 m.

## 2023-06-23 NOTE — PHYSICAL EXAM
[Restricted in physically strenuous activity but ambulatory and able to carry out work of a light or sedentary nature] : Status 1- Restricted in physically strenuous activity but ambulatory and able to carry out work of a light or sedentary nature, e.g., light house work, office work [Normal] : affect appropriate [de-identified] : healed lumpectomy scar

## 2023-06-23 NOTE — REASON FOR VISIT
[Follow-Up Visit] : a follow-up [Other: _____] : [unfilled] [FreeTextEntry2] : Breast cancer ER/TX + HER-2 -

## 2023-06-23 NOTE — HISTORY OF PRESENT ILLNESS
[FreeTextEntry1] : Letrozole started July 2016 [de-identified] : Carolyn Busby is a 69-year-old lady who underwent diagnostic mammogram and sonogram on 2/10/16 which showed new left breast distortion and a stereotactic biopsy was recommended. The biopsy was performed at [facility] on 2/22/16 which showed invasive well-differentiated ductal carcinoma with tubular features, Arturo score 4 of 9, 0.3 cm, DCIS low-grade, %, %, HER-2 negative. She underwent breast MRI on 3/3/16 which showed additional 0.7 cm indeterminate small focal area of enhancement in central posterior left breast  and a 0.8 cm enhancing nodule superior central right breast at 12:00.\par She underwent MRI guided biopsy of both lesions. The pathology report from left side showed fibrocystic changes. 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 She underwent bilateral lumpectomies and left sentinel axillary lymph node excision on 3/15/16 which showed invasive ductal carcinoma, measuring 3 mm, grade 1, intermediate grade DCIS in the left breast. 3 out of 3 sentinel lymph nodes were negative. Margins were negative and no lymphovascular invasion was noted. %, %, HER-2/nimco negative. Right breast excision showed flat epithelial atypia and benign changes.\par \par The last bone density scan from 7/10/2014 showed osteopenia with a T score of -1.6 at left femoral neck. \par She refused genetic testing as her niece has been tested negative for deleterious BRCA mutation.\par RT completed June 2016\par  [de-identified] : Ms. RENE BARROSO is here for f/u left breast cancer on endocrine therapy since 7/2016\par \par 12/16/2022\par \par On letrozole x 7 yrs. Good compliance. Has chronic back pain 2/2 bulging discs, which is unchanged, doesn’t need pain meds. No new aches/pain, hot flashes, vag dryness, GI s/e, hair loss. She is using CBD oil. She is active, no change in energy, wt or appetite. Her cholesterol f/b PMD- well controlled. \par Breast imaging with Dr Reddy: 3/2021: BIRADS 2, MRI 9/2021, 3/2022 birads 2\par DEXA 11/2018- osteopenia (-1.8), 12/2020 osteopenia (-1.7) cont vit D, high calcium diet. She is seeing endo for high calcium and PTH. \par She got COVID and is recovering. Dint need hospitalization. Stressed out today as  underwent surgery for bladder cancer. s/p both vaccine. \par She had bad UTI, with hematuria 3/2021. Cystoscopy and CT A/P LIZETTE. Sx resolved with hydration\par \par She will be 5 years out in 7/2021. We discussed she had 3mm NODE NEG and I recommend to stop it. Patient is very nervous about stopping it. She has minimal toxicity. We maddison continue for 7 years ( 7/2023) [Date: ____________] : Patient's last distress assessment performed on [unfilled]. [0 - No Distress] : Distress Level: 0 [Patient given social work contact information and resource sheet] : Patient was given social work contact information and resource sheet

## 2023-09-29 ENCOUNTER — APPOINTMENT (OUTPATIENT)
Dept: MRI IMAGING | Facility: IMAGING CENTER | Age: 73
End: 2023-09-29
Payer: MEDICARE

## 2023-09-29 ENCOUNTER — OUTPATIENT (OUTPATIENT)
Dept: OUTPATIENT SERVICES | Facility: HOSPITAL | Age: 73
LOS: 1 days | End: 2023-09-29
Payer: MEDICARE

## 2023-09-29 DIAGNOSIS — Z00.8 ENCOUNTER FOR OTHER GENERAL EXAMINATION: ICD-10-CM

## 2023-09-29 DIAGNOSIS — N60.29 FIBROADENOSIS OF UNSPECIFIED BREAST: Chronic | ICD-10-CM

## 2023-09-29 DIAGNOSIS — Z90.49 ACQUIRED ABSENCE OF OTHER SPECIFIED PARTS OF DIGESTIVE TRACT: Chronic | ICD-10-CM

## 2023-09-29 PROCEDURE — A9585: CPT

## 2023-09-29 PROCEDURE — C8937: CPT

## 2023-09-29 PROCEDURE — 77049 MRI BREAST C-+ W/CAD BI: CPT | Mod: 26,MH

## 2023-09-29 PROCEDURE — C8908: CPT | Mod: MH

## 2023-10-24 NOTE — ASSESSMENT
[FreeTextEntry1] : In summary, this is a 71-year-old postmenopausal  lady with stage IA invasive ductal carcinoma of the left breast. T1a, N0, M0. ER positive, WI positive, HER-2/nimco negative. She is status post lumpectomy and sentinel axillary lymph node excision.  Patient has a good performance status and is generally very healthy. She completed RT and started Letrozole July 2016.\par \par - Breast ca:  Tolerating Letrozole very well without significant side effects. Reports good compliance. Continue AI for total of 5-7 years. Breast imaging  3/2021 reviewed\par She will be 5 years out in 7/2021. We discussed she had 3mm NODE NEG and I recommend to stop it. Patient is very nervous about stopping it. She has minimal toxicity. We maddison continue for 7 years ( 7/2023)\par - Osteopenia: concern for worsening bone density due to anastrozole. Rec to  continue calcium and vit D. DEXA 1-2 yrs. \par - High cholesterol: concern for worsening cholesterol due to anastrozole. Pt is on zocor. Lipid profile annually.\par - Continue to followup with primary care and Gyn\par - BW 3/2021 reviewed from Dr Ann's office \par - High PTH, normal calcium: Monitor calcium, referred to see endo\par - She got COVID and is recovering. Dint need hospitalization. Stressed out today as  underwent surgery for bladder cancer. s/p both vaccine\par - She had bad UTI, with hematuria 3/2021. Cystoscopy and CT A/P LIZETTE. Sx resolved with hydration\par RTC 6 m. 
done

## 2023-12-11 ENCOUNTER — APPOINTMENT (OUTPATIENT)
Dept: SURGERY | Facility: CLINIC | Age: 73
End: 2023-12-11
Payer: MEDICARE

## 2023-12-11 PROCEDURE — 99213K: CUSTOM

## 2024-04-12 ENCOUNTER — OUTPATIENT (OUTPATIENT)
Dept: OUTPATIENT SERVICES | Facility: HOSPITAL | Age: 74
LOS: 1 days | End: 2024-04-12
Payer: MEDICARE

## 2024-04-12 ENCOUNTER — APPOINTMENT (OUTPATIENT)
Dept: MAMMOGRAPHY | Facility: IMAGING CENTER | Age: 74
End: 2024-04-12
Payer: MEDICARE

## 2024-04-12 ENCOUNTER — APPOINTMENT (OUTPATIENT)
Dept: ULTRASOUND IMAGING | Facility: IMAGING CENTER | Age: 74
End: 2024-04-12
Payer: MEDICARE

## 2024-04-12 DIAGNOSIS — N60.29 FIBROADENOSIS OF UNSPECIFIED BREAST: Chronic | ICD-10-CM

## 2024-04-12 DIAGNOSIS — Z90.49 ACQUIRED ABSENCE OF OTHER SPECIFIED PARTS OF DIGESTIVE TRACT: Chronic | ICD-10-CM

## 2024-04-12 DIAGNOSIS — Z00.8 ENCOUNTER FOR OTHER GENERAL EXAMINATION: ICD-10-CM

## 2024-04-12 PROCEDURE — 76641 ULTRASOUND BREAST COMPLETE: CPT | Mod: 26,50,GY

## 2024-04-12 PROCEDURE — 76641 ULTRASOUND BREAST COMPLETE: CPT

## 2024-04-12 PROCEDURE — 77067 SCR MAMMO BI INCL CAD: CPT | Mod: 26

## 2024-04-12 PROCEDURE — 77067 SCR MAMMO BI INCL CAD: CPT

## 2024-04-12 PROCEDURE — 77063 BREAST TOMOSYNTHESIS BI: CPT

## 2024-04-12 PROCEDURE — 77063 BREAST TOMOSYNTHESIS BI: CPT | Mod: 26

## 2024-06-11 ENCOUNTER — OUTPATIENT (OUTPATIENT)
Dept: OUTPATIENT SERVICES | Facility: HOSPITAL | Age: 74
LOS: 1 days | Discharge: HOME | End: 2024-06-11

## 2024-06-11 DIAGNOSIS — Z90.49 ACQUIRED ABSENCE OF OTHER SPECIFIED PARTS OF DIGESTIVE TRACT: Chronic | ICD-10-CM

## 2024-06-11 DIAGNOSIS — N60.29 FIBROADENOSIS OF UNSPECIFIED BREAST: Chronic | ICD-10-CM

## 2024-06-11 DIAGNOSIS — C50.919 MALIGNANT NEOPLASM OF UNSPECIFIED SITE OF UNSPECIFIED FEMALE BREAST: ICD-10-CM

## 2024-06-13 ENCOUNTER — NON-APPOINTMENT (OUTPATIENT)
Age: 74
End: 2024-06-13

## 2024-06-14 ENCOUNTER — APPOINTMENT (OUTPATIENT)
Dept: HEMATOLOGY ONCOLOGY | Facility: CLINIC | Age: 74
End: 2024-06-14
Payer: MEDICARE

## 2024-06-14 VITALS
BODY MASS INDEX: 24.5 KG/M2 | HEART RATE: 92 BPM | HEIGHT: 64.02 IN | OXYGEN SATURATION: 99 % | RESPIRATION RATE: 16 BRPM | SYSTOLIC BLOOD PRESSURE: 125 MMHG | TEMPERATURE: 98 F | DIASTOLIC BLOOD PRESSURE: 80 MMHG | WEIGHT: 143.52 LBS

## 2024-06-14 DIAGNOSIS — C50.919 MALIGNANT NEOPLASM OF UNSPECIFIED SITE OF UNSPECIFIED FEMALE BREAST: ICD-10-CM

## 2024-06-14 PROCEDURE — 99213 OFFICE O/P EST LOW 20 MIN: CPT

## 2024-06-14 PROCEDURE — G2211 COMPLEX E/M VISIT ADD ON: CPT

## 2024-06-14 NOTE — PHYSICAL EXAM
[Restricted in physically strenuous activity but ambulatory and able to carry out work of a light or sedentary nature] : Status 1- Restricted in physically strenuous activity but ambulatory and able to carry out work of a light or sedentary nature, e.g., light house work, office work [Normal] : affect appropriate [de-identified] : healed lumpectomy scar

## 2024-06-14 NOTE — REASON FOR VISIT
[Follow-Up Visit] : a follow-up [Other: _____] : [unfilled] [FreeTextEntry2] : Breast cancer ER/MI + HER-2 -

## 2024-06-14 NOTE — HISTORY OF PRESENT ILLNESS
[de-identified] : Carolyn Busby is a 69-year-old lady who underwent diagnostic mammogram and sonogram on 2/10/16 which showed new left breast distortion and a stereotactic biopsy was recommended. The biopsy was performed at [facility] on 2/22/16 which showed invasive well-differentiated ductal carcinoma with tubular features, Arturo score 4 of 9, 0.3 cm, DCIS low-grade, %, %, HER-2 negative. She underwent breast MRI on 3/3/16 which showed additional 0.7 cm indeterminate small focal area of enhancement in central posterior left breast  and a 0.8 cm enhancing nodule superior central right breast at 12:00. She underwent MRI guided biopsy of both lesions. The pathology report from left side showed fibrocystic changes. 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 She underwent bilateral lumpectomies and left sentinel axillary lymph node excision on 3/15/16 which showed invasive ductal carcinoma, measuring 3 mm, grade 1, intermediate grade DCIS in the left breast. 3 out of 3 sentinel lymph nodes were negative. Margins were negative and no lymphovascular invasion was noted. %, %, HER-2/nimco negative. Right breast excision showed flat epithelial atypia and benign changes.  The last bone density scan from 7/10/2014 showed osteopenia with a T score of -1.6 at left femoral neck.  She refused genetic testing as her niece has been tested negative for deleterious BRCA mutation. RT completed June 2016 6/2023  On letrozole x 7 yrs. Good compliance. Has chronic back pain 2/2 bulging discs, which is unchanged, doesn't need pain meds. No new aches/pain, hot flashes, vag dryness, GI s/e, hair loss. She is using CBD oil. She is active, no change in energy, wt or appetite. Her cholesterol f/b PMD- well controlled.  Breast imaging with Dr Reddy: 3/2021: BIRADS 2, MRI 9/2021, 3/2022 birads 2 DEXA 11/2018- osteopenia (-1.8), 12/2020 osteopenia (-1.7) cont vit D, high calcium diet. She is seeing endo for high calcium and PTH.  She got COVID and is recovering. Dint need hospitalization. Stressed out today as  underwent surgery for bladder cancer. s/p both vaccine.  She had bad UTI, with hematuria 3/2021. Cystoscopy and CT A/P LIZETTE. Sx resolved with hydration She will be 5 years out in 7/2021. We discussed she had 3mm NODE NEG and I recommend to stop it. Patient is very nervous about stopping it. She has minimal toxicity. We maddison continue for 7 years ( 7/2023) [FreeTextEntry1] : Letrozole started July 2016 [de-identified] : Ms. RENE BARROSO is here for f/u left breast cancer on endocrine therapy since 7/2016 6/2024  completed 7/2023. She continues to have arthritis but not taking pain meds. She is active, no change in energy, wt or appetite.  M/S with Dr Reddy  Age appropriate cancer screen d/w her life style changes reviewed refer to survivorship [Date: ____________] : Patient's last distress assessment performed on [unfilled]. [0 - No Distress] : Distress Level: 0 [Patient given social work contact information and resource sheet] : Patient was given social work contact information and resource sheet

## 2024-06-14 NOTE — ASSESSMENT
[FreeTextEntry1] : In summary, this is a 71-year-old postmenopausal lady with stage IA invasive ductal carcinoma of the left breast. T1a, N0, M0. ER positive, MA positive, HER-2/nimco negative. She is status post lumpectomy and sentinel axillary lymph node excision. Patient has a good performance status and is generally very healthy. She completed RT and started Letrozole July 2016.  6/2024  completed 7/2023. She continues to have arthritis but not taking pain meds. She is active, no change in energy, wt or appetite.  M/S with Dr Reddy  Age appropriate cancer screen d/w her life style changes reviewed refer to survivorship

## 2024-10-17 ENCOUNTER — OUTPATIENT (OUTPATIENT)
Dept: OUTPATIENT SERVICES | Facility: HOSPITAL | Age: 74
LOS: 1 days | End: 2024-10-17
Payer: MEDICARE

## 2024-10-17 ENCOUNTER — APPOINTMENT (OUTPATIENT)
Dept: MRI IMAGING | Facility: IMAGING CENTER | Age: 74
End: 2024-10-17
Payer: MEDICARE

## 2024-10-17 DIAGNOSIS — Z90.49 ACQUIRED ABSENCE OF OTHER SPECIFIED PARTS OF DIGESTIVE TRACT: Chronic | ICD-10-CM

## 2024-10-17 DIAGNOSIS — N60.29 FIBROADENOSIS OF UNSPECIFIED BREAST: Chronic | ICD-10-CM

## 2024-10-17 DIAGNOSIS — Z00.8 ENCOUNTER FOR OTHER GENERAL EXAMINATION: ICD-10-CM

## 2024-10-17 PROCEDURE — 77049 MRI BREAST C-+ W/CAD BI: CPT | Mod: 26,MH

## 2024-11-20 PROCEDURE — A9585: CPT

## 2024-11-20 PROCEDURE — C8937: CPT

## 2024-11-20 PROCEDURE — C8908: CPT | Mod: MH

## 2024-12-09 ENCOUNTER — APPOINTMENT (OUTPATIENT)
Dept: SURGERY | Facility: CLINIC | Age: 74
End: 2024-12-09
Payer: MEDICARE

## 2024-12-09 PROCEDURE — 99213K: CUSTOM

## 2024-12-30 ENCOUNTER — APPOINTMENT (OUTPATIENT)
Dept: RADIOLOGY | Facility: IMAGING CENTER | Age: 74
End: 2024-12-30
Payer: MEDICARE

## 2024-12-30 ENCOUNTER — OUTPATIENT (OUTPATIENT)
Dept: OUTPATIENT SERVICES | Facility: HOSPITAL | Age: 74
LOS: 1 days | End: 2024-12-30
Payer: MEDICARE

## 2024-12-30 DIAGNOSIS — Z00.8 ENCOUNTER FOR OTHER GENERAL EXAMINATION: ICD-10-CM

## 2024-12-30 DIAGNOSIS — Z90.49 ACQUIRED ABSENCE OF OTHER SPECIFIED PARTS OF DIGESTIVE TRACT: Chronic | ICD-10-CM

## 2024-12-30 DIAGNOSIS — N60.29 FIBROADENOSIS OF UNSPECIFIED BREAST: Chronic | ICD-10-CM

## 2024-12-30 PROCEDURE — 77080 DXA BONE DENSITY AXIAL: CPT

## 2024-12-30 PROCEDURE — 77080 DXA BONE DENSITY AXIAL: CPT | Mod: 26

## 2025-04-14 ENCOUNTER — APPOINTMENT (OUTPATIENT)
Dept: ULTRASOUND IMAGING | Facility: IMAGING CENTER | Age: 75
End: 2025-04-14

## 2025-04-14 ENCOUNTER — OUTPATIENT (OUTPATIENT)
Dept: OUTPATIENT SERVICES | Facility: HOSPITAL | Age: 75
LOS: 1 days | End: 2025-04-14
Payer: MEDICARE

## 2025-04-14 ENCOUNTER — APPOINTMENT (OUTPATIENT)
Dept: MAMMOGRAPHY | Facility: IMAGING CENTER | Age: 75
End: 2025-04-14

## 2025-04-14 DIAGNOSIS — Z90.49 ACQUIRED ABSENCE OF OTHER SPECIFIED PARTS OF DIGESTIVE TRACT: Chronic | ICD-10-CM

## 2025-04-14 DIAGNOSIS — N60.29 FIBROADENOSIS OF UNSPECIFIED BREAST: Chronic | ICD-10-CM

## 2025-04-14 DIAGNOSIS — Z00.8 ENCOUNTER FOR OTHER GENERAL EXAMINATION: ICD-10-CM

## 2025-04-14 PROCEDURE — 77067 SCR MAMMO BI INCL CAD: CPT

## 2025-04-14 PROCEDURE — 77063 BREAST TOMOSYNTHESIS BI: CPT | Mod: 26

## 2025-04-14 PROCEDURE — 77067 SCR MAMMO BI INCL CAD: CPT | Mod: 26

## 2025-04-14 PROCEDURE — 76641 ULTRASOUND BREAST COMPLETE: CPT | Mod: 26,50,GA

## 2025-04-14 PROCEDURE — 76641 ULTRASOUND BREAST COMPLETE: CPT

## 2025-04-14 PROCEDURE — 77063 BREAST TOMOSYNTHESIS BI: CPT
